# Patient Record
Sex: FEMALE | Race: WHITE | NOT HISPANIC OR LATINO | Employment: OTHER | URBAN - NONMETROPOLITAN AREA
[De-identification: names, ages, dates, MRNs, and addresses within clinical notes are randomized per-mention and may not be internally consistent; named-entity substitution may affect disease eponyms.]

---

## 2018-01-25 ENCOUNTER — HISTORICAL (OUTPATIENT)
Dept: ADMINISTRATIVE | Facility: HOSPITAL | Age: 83
End: 2018-01-25

## 2019-12-16 ENCOUNTER — HISTORICAL (OUTPATIENT)
Dept: ADMINISTRATIVE | Facility: HOSPITAL | Age: 84
End: 2019-12-16

## 2022-05-19 ENCOUNTER — HISTORICAL (OUTPATIENT)
Dept: ADMINISTRATIVE | Facility: HOSPITAL | Age: 87
End: 2022-05-19

## 2022-10-28 ENCOUNTER — HOSPITAL ENCOUNTER (EMERGENCY)
Facility: HOSPITAL | Age: 87
Discharge: HOME | End: 2022-10-28
Attending: EMERGENCY MEDICINE
Payer: MEDICARE

## 2022-10-28 VITALS
BODY MASS INDEX: 19.49 KG/M2 | WEIGHT: 110 LBS | RESPIRATION RATE: 18 BRPM | HEART RATE: 82 BPM | SYSTOLIC BLOOD PRESSURE: 155 MMHG | HEIGHT: 63 IN | DIASTOLIC BLOOD PRESSURE: 79 MMHG | TEMPERATURE: 98 F | OXYGEN SATURATION: 98 %

## 2022-10-28 DIAGNOSIS — M25.579 ANKLE PAIN, UNSPECIFIED CHRONICITY, UNSPECIFIED LATERALITY: Primary | ICD-10-CM

## 2022-10-28 DIAGNOSIS — R52 PAIN: ICD-10-CM

## 2022-10-28 DIAGNOSIS — M79.605 LEFT LEG PAIN: ICD-10-CM

## 2022-10-28 PROCEDURE — 99284 EMERGENCY DEPT VISIT MOD MDM: CPT | Mod: 25

## 2022-10-28 PROCEDURE — 96372 THER/PROPH/DIAG INJ SC/IM: CPT | Performed by: NURSE PRACTITIONER

## 2022-10-28 PROCEDURE — 63600175 PHARM REV CODE 636 W HCPCS: Performed by: NURSE PRACTITIONER

## 2022-10-28 RX ORDER — INDOMETHACIN 50 MG/1
50 CAPSULE ORAL
Qty: 15 CAPSULE | Refills: 0 | Status: SHIPPED | OUTPATIENT
Start: 2022-10-28 | End: 2023-03-10

## 2022-10-28 RX ORDER — KETOROLAC TROMETHAMINE 30 MG/ML
30 INJECTION, SOLUTION INTRAMUSCULAR; INTRAVENOUS
Status: COMPLETED | OUTPATIENT
Start: 2022-10-28 | End: 2022-10-28

## 2022-10-28 RX ADMIN — KETOROLAC TROMETHAMINE 30 MG: 60 INJECTION, SOLUTION INTRAMUSCULAR at 05:10

## 2022-10-28 NOTE — ED PROVIDER NOTES
Encounter Date: 10/28/2022       History     Chief Complaint   Patient presents with    Ankle Pain    Leg Pain    Knee Pain     C/o left ankle pain, lt foot pain, lt leg pain, and lt knee pain for few days     89-year-old  female presents emerged department complaints of left ankle, foot, leg pain that started a few days ago.  She states this started when she woke up 1 morning.  She denies any injury or trauma.  She does have history of DVT and stop taking her anticoagulants because she stated that it was killing her.  Patient states the pain is alleviated with rest and immobilization worse with any palpation or putting weight.  Patient denies any other complaints at this time.    Review of patient's allergies indicates:   Allergen Reactions    Acetaminophen     Sulfa (sulfonamide antibiotics)      No past medical history on file.  No past surgical history on file.  No family history on file.  Social History     Tobacco Use    Smoking status: Never    Smokeless tobacco: Never     Review of Systems   Constitutional:  Negative for activity change, appetite change, chills and fever.   HENT:  Negative for congestion, dental problem, sore throat and voice change.    Eyes:  Negative for discharge and itching.   Respiratory:  Negative for apnea, chest tightness and shortness of breath.    Cardiovascular:  Negative for chest pain.   Gastrointestinal:  Negative for abdominal distention, abdominal pain and nausea.   Endocrine: Negative for cold intolerance and heat intolerance.   Genitourinary:  Negative for dysuria, vaginal discharge and vaginal pain.   Musculoskeletal:  Positive for myalgias. Negative for back pain.        Left leg pain.   Skin:  Negative for rash and wound.   Allergic/Immunologic: Negative for environmental allergies and food allergies.   Neurological:  Negative for dizziness, facial asymmetry and weakness.   Hematological:  Does not bruise/bleed easily.   Psychiatric/Behavioral:  Negative for  agitation and behavioral problems.    All other systems reviewed and are negative.    Physical Exam     Initial Vitals [10/28/22 1516]   BP Pulse Resp Temp SpO2   (!) 174/90 70 18 97.4 °F (36.3 °C) 97 %      MAP       --         Physical Exam    Nursing note and vitals reviewed.  Constitutional: Vital signs are normal. She appears well-developed and well-nourished.  Non-toxic appearance. She does not have a sickly appearance.   HENT:   Head: Normocephalic and atraumatic.   Right Ear: External ear normal.   Left Ear: External ear normal.   Eyes: Conjunctivae, EOM and lids are normal. Pupils are equal, round, and reactive to light. Lids are everted and swept, no foreign bodies found.   Neck: Trachea normal and phonation normal. Neck supple. No thyroid mass and no thyromegaly present.   Normal range of motion.   Full passive range of motion without pain.     Cardiovascular:  Normal rate, regular rhythm, S1 normal, S2 normal, normal heart sounds, intact distal pulses and normal pulses.           Pulmonary/Chest: Breath sounds normal.   Abdominal: Abdomen is soft.   Musculoskeletal:         General: Tenderness present. No edema.      Cervical back: Full passive range of motion without pain, normal range of motion and neck supple.      Comments: Limited range of motion due to the pain.  Bilateral legs do appear pale but they are both warm to touch with comparison.  Palpable pulses.  Cap refills intact.     Lymphadenopathy:     She has no cervical adenopathy.   Neurological: She is alert and oriented to person, place, and time. She has normal strength and normal reflexes. GCS score is 15. GCS eye subscore is 4. GCS verbal subscore is 5. GCS motor subscore is 6.   Skin: Skin is warm, dry and intact. Capillary refill takes less than 2 seconds.   Psychiatric: She has a normal mood and affect. Her speech is normal and behavior is normal. Judgment normal. Cognition and memory are normal.       ED Course   Procedures  Labs  Reviewed - No data to display       Imaging Results              US Lower Extremity Veins Left (Final result)  Result time 10/28/22 16:16:41      Final result by Augusto Rosenthal MD (10/28/22 16:16:41)                   Impression:      1. NEGATIVE LEFT LOWER EXTREMITY VENOUS DOPPLER EXAM WITHOUT SONOGRAPHIC EVIDENCE OF A DEEP VENOUS THROMBOSIS.      Electronically signed by: Augusto Rosenthal  Date:    10/28/2022  Time:    16:16               Narrative:    EXAMINATION:  LEFT LOWER EXTREMITY VENOUS ULTRASOUND:    CLINICAL HISTORY:  Pain in left leg,    COMPARISON:  None available    FINDINGS:  There is normal compression and flow noted to the left common femoral, superficial femoral, popliteal, and  posterior tibial veins .  No evidence of deep venous thrombosis is identified                                       X-Ray Ankle Complete Left (Final result)  Result time 10/28/22 16:16:25      Final result by Augusto Rosenthal MD (10/28/22 16:16:25)                   Impression:      1. Acute osseous defect identified  2. Osteopenia  3. Enthesophyte inferior calcaneus 4.  MR examination would allow further evaluation if clinically imdicated      Electronically signed by: Augusto Rosenthal  Date:    10/28/2022  Time:    16:16               Narrative:    EXAMINATION:  XR ANKLE COMPLETE 3 VIEW LEFT    CLINICAL HISTORY:  Pain, unspecified; .    COMPARISON:  None available.    FINDINGS:  AP, lateral, and obliques views reveal no definite fracture or dislocation.  The ankle mortise is intact.  No aggressive osteolytic or osteoblastic lesion is seen.  Bony structures are osteopenic.  A small enthesophyte is noted to the inferior calcaneus.  Atherosclerosis is seen.                                       X-Ray Foot Complete Left (Final result)  Result time 10/28/22 16:15:50      Final result by Augusto Rosenthal MD (10/28/22 16:15:50)                   Impression:      1. No acute osseous defect identified  2. Mild osteoarthritis  3.  Osteopenia  4. Slight hallucis valgus  5. MR examination would allow further evaluation if clinically imdicated      Electronically signed by: Augusto Rosenthal  Date:    10/28/2022  Time:    16:15               Narrative:    EXAMINATION:  XR FOOT COMPLETE 3 VIEW LEFT    CLINICAL HISTORY:  Pain, unspecified; .    COMPARISON:  None available.    FINDINGS:  AP, lateral, and obliques views revealno definite fracture or dislocation.  Bony structures are osteopenic.  There is mild flattening of the plantar longitudinal arch.  A small enthesophyte is evident at the inferior calcaneus.  There is mild persistent flexion/extension of the toes.  There is slight lateral deviation of the 1st toe.                                       Medications - No data to display  Medical Decision Making:   Initial Assessment:   Bilateral pedal pulses Doppler at this time and are equal with left versus the right.  Will do ultrasound and x-rays rule out any bony abnormality or vascular issue.  Will defer the foot plan care once his results have been obtained.                        Clinical Impression:   Final diagnoses:  [R52] Pain  [M79.605] Left leg pain  [M25.579] Ankle pain, unspecified chronicity, unspecified laterality (Primary)        ED Disposition Condition    Discharge Stable          ED Prescriptions       Medication Sig Dispense Start Date End Date Auth. Provider    indomethacin (INDOCIN) 50 MG capsule Take 1 capsule (50 mg total) by mouth 3 (three) times daily with meals. 15 capsule 10/28/2022 -- MELO Mack          Follow-up Information       Follow up With Specialties Details Why Contact Info Additional Information    [facility] Call in 1 week As needed, For ER Follow Up. [address] [facility] [id number] [phone] [facility]             MELO Mack  10/28/22 3251

## 2022-10-28 NOTE — DISCHARGE INSTRUCTIONS
Take medicines as prescribed    See your family doctor in one to 2 days for further evaluation, workup, and treatment as necessary    Avoid driving or operating machinery while taking medicines as some medicines might cause drowsiness and may cause problems. Also pain medicines have potential of being addictive  so use Pain meds specially Narcotics Sparingly.    The exam and treatment you received in Emergency Room was for an urgent problem and NOT INTENDED AS COMPLETE CARE. It is important that you FOLLOW UP with a doctor for ongoing care. If your symptoms become WORSE or you DO NOT IMPROVE and you are unable to reach your health care provider, you should RETURN to the emergency department. The Emergency Room doctor has provided a PRELIMINARY INTERPRETATION of all your STUDIES. A final interpretation may be done after you are discharged. IF A CHANGE in your diagnosis or treatment is needed WE WILL CONTACT YOU. It is critical that we have a CURRENT PHONE NUMBER FOR YOU.    
No

## 2022-12-05 ENCOUNTER — LAB VISIT (OUTPATIENT)
Dept: LAB | Facility: HOSPITAL | Age: 87
End: 2022-12-05
Attending: FAMILY MEDICINE
Payer: MEDICARE

## 2022-12-05 DIAGNOSIS — I25.119 ATHEROSCLEROTIC HEART DISEASE OF NATIVE CORONARY ARTERY WITH ANGINA PECTORIS: ICD-10-CM

## 2022-12-05 DIAGNOSIS — E55.9 VITAMIN D DEFICIENCY DISEASE: ICD-10-CM

## 2022-12-05 DIAGNOSIS — I10 ESSENTIAL HYPERTENSION, MALIGNANT: Primary | ICD-10-CM

## 2022-12-05 LAB
ALBUMIN SERPL-MCNC: 3.7 GM/DL (ref 3.4–4.8)
ALBUMIN/GLOB SERPL: 1.5 RATIO (ref 1.1–2)
ALP SERPL-CCNC: 80 UNIT/L (ref 40–150)
ALT SERPL-CCNC: 14 UNIT/L (ref 0–55)
AST SERPL-CCNC: 23 UNIT/L (ref 5–34)
BASOPHILS # BLD AUTO: 0.01 X10(3)/MCL (ref 0–0.2)
BASOPHILS NFR BLD AUTO: 0.3 %
BILIRUBIN DIRECT+TOT PNL SERPL-MCNC: 0.4 MG/DL
BUN SERPL-MCNC: 15 MG/DL (ref 9.8–20.1)
CALCIUM SERPL-MCNC: 9 MG/DL (ref 8.4–10.2)
CHLORIDE SERPL-SCNC: 109 MMOL/L (ref 98–107)
CHOLEST SERPL-MCNC: 130 MG/DL
CHOLEST/HDLC SERPL: 4 {RATIO} (ref 0–5)
CO2 SERPL-SCNC: 24 MMOL/L (ref 23–31)
CREAT SERPL-MCNC: 1.19 MG/DL (ref 0.55–1.02)
DEPRECATED CALCIDIOL+CALCIFEROL SERPL-MC: 12.7 NG/ML (ref 30–80)
EOSINOPHIL # BLD AUTO: 0.05 X10(3)/MCL (ref 0–0.9)
EOSINOPHIL NFR BLD AUTO: 1.5 %
ERYTHROCYTE [DISTWIDTH] IN BLOOD BY AUTOMATED COUNT: 16.1 % (ref 11.5–17)
GFR SERPLBLD CREATININE-BSD FMLA CKD-EPI: 44 MLS/MIN/1.73/M2
GLOBULIN SER-MCNC: 2.5 GM/DL (ref 2.4–3.5)
GLUCOSE SERPL-MCNC: 118 MG/DL (ref 82–115)
HCT VFR BLD AUTO: 29.8 % (ref 37–47)
HDLC SERPL-MCNC: 36 MG/DL (ref 35–60)
HGB BLD-MCNC: 9.2 GM/DL (ref 12–16)
IMM GRANULOCYTES # BLD AUTO: 0.02 X10(3)/MCL (ref 0–0.04)
IMM GRANULOCYTES NFR BLD AUTO: 0.6 %
LDLC SERPL CALC-MCNC: 80 MG/DL (ref 50–140)
LYMPHOCYTES # BLD AUTO: 0.6 X10(3)/MCL (ref 0.6–4.6)
LYMPHOCYTES NFR BLD AUTO: 17.9 %
MCH RBC QN AUTO: 29.7 PG (ref 27–31)
MCHC RBC AUTO-ENTMCNC: 30.9 MG/DL (ref 33–36)
MCV RBC AUTO: 96.1 FL (ref 80–94)
MONOCYTES # BLD AUTO: 0.46 X10(3)/MCL (ref 0.1–1.3)
MONOCYTES NFR BLD AUTO: 13.7 %
NEUTROPHILS # BLD AUTO: 2.2 X10(3)/MCL (ref 2.1–9.2)
NEUTROPHILS NFR BLD AUTO: 66 %
PLATELET # BLD AUTO: 220 X10(3)/MCL (ref 130–400)
PMV BLD AUTO: 10.1 FL (ref 7.4–10.4)
POTASSIUM SERPL-SCNC: 4.4 MMOL/L (ref 3.5–5.1)
PROT SERPL-MCNC: 6.2 GM/DL (ref 5.8–7.6)
RBC # BLD AUTO: 3.1 X10(6)/MCL (ref 4.2–5.4)
SODIUM SERPL-SCNC: 141 MMOL/L (ref 136–145)
T4 FREE SERPL-MCNC: 0.59 NG/DL (ref 0.7–1.48)
TRIGL SERPL-MCNC: 71 MG/DL (ref 37–140)
TSH SERPL-ACNC: 51.11 UIU/ML (ref 0.35–4.94)
VLDLC SERPL CALC-MCNC: 14 MG/DL
WBC # SPEC AUTO: 3.4 X10(3)/MCL (ref 4.5–11.5)

## 2022-12-05 PROCEDURE — 84443 ASSAY THYROID STIM HORMONE: CPT

## 2022-12-05 PROCEDURE — 36415 COLL VENOUS BLD VENIPUNCTURE: CPT

## 2022-12-05 PROCEDURE — 85025 COMPLETE CBC W/AUTO DIFF WBC: CPT

## 2022-12-05 PROCEDURE — 84439 ASSAY OF FREE THYROXINE: CPT

## 2022-12-05 PROCEDURE — 80061 LIPID PANEL: CPT

## 2022-12-05 PROCEDURE — 80053 COMPREHEN METABOLIC PANEL: CPT

## 2022-12-05 PROCEDURE — 82306 VITAMIN D 25 HYDROXY: CPT

## 2022-12-28 ENCOUNTER — HISTORICAL (OUTPATIENT)
Dept: ADMINISTRATIVE | Facility: HOSPITAL | Age: 87
End: 2022-12-28
Payer: MEDICARE

## 2023-01-06 ENCOUNTER — HISTORICAL (OUTPATIENT)
Dept: ADMINISTRATIVE | Facility: HOSPITAL | Age: 88
End: 2023-01-06
Payer: MEDICARE

## 2023-01-16 ENCOUNTER — HISTORICAL (OUTPATIENT)
Dept: ADMINISTRATIVE | Facility: HOSPITAL | Age: 88
End: 2023-01-16
Payer: MEDICARE

## 2023-02-17 ENCOUNTER — LAB VISIT (OUTPATIENT)
Dept: LAB | Facility: HOSPITAL | Age: 88
End: 2023-02-17
Attending: SPECIALIST
Payer: MEDICARE

## 2023-02-17 DIAGNOSIS — M31.6 GIANT CELL ARTERITIS: Primary | ICD-10-CM

## 2023-02-17 LAB
ABS NEUT CALC (OHS): 2.38 X10(3)/MCL (ref 2.1–9.2)
C-REACTIVE PROTEIN(NORTH LA, ST. MARY, RP & JENNINGS): <0.5 MG/DL (ref 0–0.9)
EOSINOPHIL NFR BLD MANUAL: 0.04 X10(3)/MCL (ref 0–0.9)
EOSINOPHIL NFR BLD MANUAL: 1 % (ref 0–8)
ERYTHROCYTE [DISTWIDTH] IN BLOOD BY AUTOMATED COUNT: 14.2 % (ref 11–14.5)
ERYTHROCYTE [SEDIMENTATION RATE] IN BLOOD: 4 MM/HR (ref 0–20)
HCT VFR BLD AUTO: 28.9 % (ref 36–48)
HGB BLD-MCNC: 9.5 GM/DL (ref 11.8–16)
IMM GRANULOCYTES # BLD AUTO: 0.01 X10(3)/MCL (ref 0–0.03)
IMM GRANULOCYTES NFR BLD AUTO: 0.3 % (ref 0–0.5)
LYMPHOCYTES NFR BLD MANUAL: 0.76 X10(3)/MCL
LYMPHOCYTES NFR BLD MANUAL: 21 % (ref 13–40)
MCH RBC QN AUTO: 31.1 PG (ref 27–34)
MCV RBC AUTO: 94.8 FL (ref 79–99)
MEAN CELL HEMOGLOBIN CONCENTRATION (OHS) G/DL: 32.9 G/DL (ref 31–37)
MONOCYTES NFR BLD MANUAL: 0.43 X10(3)/MCL (ref 0.1–1.3)
MONOCYTES NFR BLD MANUAL: 12 % (ref 2–11)
NEUTROPHILS NFR BLD MANUAL: 66 % (ref 47–80)
NRBC BLD AUTO-RTO: 0 % (ref 0–1)
PLATELET # BLD AUTO: 181 X10(3)/MCL (ref 140–371)
PMV BLD AUTO: 9.9 FL (ref 9.4–12.4)
RBC # BLD AUTO: 3.05 X10(6)/MCL (ref 4–5.1)
WBC # SPEC AUTO: 3.6 X10(3)/MCL (ref 4–11.5)

## 2023-02-17 PROCEDURE — 85651 RBC SED RATE NONAUTOMATED: CPT

## 2023-02-17 PROCEDURE — 85025 COMPLETE CBC W/AUTO DIFF WBC: CPT

## 2023-02-17 PROCEDURE — 85027 COMPLETE CBC AUTOMATED: CPT

## 2023-02-17 PROCEDURE — 86140 C-REACTIVE PROTEIN: CPT

## 2023-02-17 PROCEDURE — 36415 COLL VENOUS BLD VENIPUNCTURE: CPT

## 2023-02-28 ENCOUNTER — LAB VISIT (OUTPATIENT)
Dept: LAB | Facility: HOSPITAL | Age: 88
End: 2023-02-28
Attending: INTERNAL MEDICINE
Payer: MEDICARE

## 2023-02-28 DIAGNOSIS — D68.9 CLOTTING DISORDER: Primary | ICD-10-CM

## 2023-02-28 DIAGNOSIS — I48.0 PAF (PAROXYSMAL ATRIAL FIBRILLATION): ICD-10-CM

## 2023-02-28 DIAGNOSIS — Z82.49 FAMILY HISTORY OF DVT: ICD-10-CM

## 2023-02-28 DIAGNOSIS — I10 HYPERTENSION, UNSPECIFIED TYPE: ICD-10-CM

## 2023-02-28 LAB
ANION GAP SERPL CALC-SCNC: 5 MEQ/L (ref 2–13)
BUN SERPL-MCNC: 26 MG/DL (ref 7–20)
CALCIUM SERPL-MCNC: 8.7 MG/DL (ref 8.4–10.2)
CHLORIDE SERPL-SCNC: 109 MMOL/L (ref 98–110)
CO2 SERPL-SCNC: 25 MMOL/L (ref 21–32)
CREAT SERPL-MCNC: 0.98 MG/DL (ref 0.66–1.25)
CREAT/UREA NIT SERPL: 27 (ref 12–20)
GFR SERPLBLD CREATININE-BSD FMLA CKD-EPI: 55 MLS/MIN/1.73/M2
GLUCOSE SERPL-MCNC: 112 MG/DL (ref 70–115)
HCT VFR BLD AUTO: 29.7 % (ref 36–48)
HGB BLD-MCNC: 9.5 G/DL (ref 11.8–16)
POTASSIUM SERPL-SCNC: 4.2 MMOL/L (ref 3.5–5.1)
SODIUM SERPL-SCNC: 139 MMOL/L (ref 135–145)

## 2023-02-28 PROCEDURE — 36415 COLL VENOUS BLD VENIPUNCTURE: CPT

## 2023-02-28 PROCEDURE — 80048 BASIC METABOLIC PNL TOTAL CA: CPT

## 2023-02-28 PROCEDURE — 85014 HEMATOCRIT: CPT

## 2023-03-03 ENCOUNTER — LAB VISIT (OUTPATIENT)
Dept: LAB | Facility: HOSPITAL | Age: 88
End: 2023-03-03
Attending: INTERNAL MEDICINE
Payer: MEDICARE

## 2023-03-03 DIAGNOSIS — Z86.69 HISTORY OF MYASTHENIA GRAVIS: ICD-10-CM

## 2023-03-03 DIAGNOSIS — E78.2 MIXED HYPERLIPIDEMIA: ICD-10-CM

## 2023-03-03 DIAGNOSIS — R06.09 DYSPNEA ON EXERTION: ICD-10-CM

## 2023-03-03 DIAGNOSIS — I82.409 DEEP VEIN THROMBOSIS: ICD-10-CM

## 2023-03-03 DIAGNOSIS — D68.9 CLOTTING DISORDER: Primary | ICD-10-CM

## 2023-03-03 DIAGNOSIS — E03.9 HYPOTHYROIDISM, UNSPECIFIED TYPE: ICD-10-CM

## 2023-03-03 DIAGNOSIS — K21.9 GASTROESOPHAGEAL REFLUX DISEASE, UNSPECIFIED WHETHER ESOPHAGITIS PRESENT: ICD-10-CM

## 2023-03-03 LAB
ABS NEUT CALC (OHS): 2.96 X10(3)/MCL (ref 2.1–9.2)
ANION GAP SERPL CALC-SCNC: 3 MEQ/L (ref 2–13)
BUN SERPL-MCNC: 25 MG/DL (ref 7–20)
CALCIUM SERPL-MCNC: 8.5 MG/DL (ref 8.4–10.2)
CHLORIDE SERPL-SCNC: 114 MMOL/L (ref 98–110)
CO2 SERPL-SCNC: 23 MMOL/L (ref 21–32)
CREAT SERPL-MCNC: 0.91 MG/DL (ref 0.66–1.25)
CREAT/UREA NIT SERPL: 27 (ref 12–20)
ERYTHROCYTE [DISTWIDTH] IN BLOOD BY AUTOMATED COUNT: 14.5 % (ref 11–14.5)
GFR SERPLBLD CREATININE-BSD FMLA CKD-EPI: 60 MLS/MIN/1.73/M2
GLUCOSE SERPL-MCNC: 96 MG/DL (ref 70–115)
HCT VFR BLD AUTO: 27.2 % (ref 36–48)
HGB BLD-MCNC: 9 G/DL (ref 11.8–16)
IMM GRANULOCYTES # BLD AUTO: 0.02 X10(3)/MCL (ref 0–0.03)
IMM GRANULOCYTES NFR BLD AUTO: 0.5 % (ref 0–0.5)
INR BLD: 1.14
LYMPHOCYTES NFR BLD MANUAL: 0.59 X10(3)/MCL
LYMPHOCYTES NFR BLD MANUAL: 16 % (ref 13–40)
MAGNESIUM SERPL-MCNC: 2.1 MG/DL (ref 1.8–2.4)
MCH RBC QN AUTO: 31.3 PG (ref 27–34)
MCV RBC AUTO: 94.4 FL (ref 79–99)
MEAN CELL HEMOGLOBIN CONCENTRATION (OHS) G/DL: 33.1 G/DL (ref 31–37)
MONOCYTES NFR BLD MANUAL: 0.15 X10(3)/MCL (ref 0.1–1.3)
MONOCYTES NFR BLD MANUAL: 4 % (ref 2–11)
NEUTROPHILS NFR BLD MANUAL: 80 % (ref 47–80)
NRBC BLD AUTO-RTO: 0 % (ref 0–1)
PLATELET # BLD AUTO: 191 X10(3)/MCL (ref 140–371)
PMV BLD AUTO: 11.3 FL (ref 9.4–12.4)
POTASSIUM SERPL-SCNC: 5.1 MMOL/L (ref 3.5–5.1)
PROTHROMBIN TIME: 11.5 SECONDS (ref 9.3–11.9)
RBC # BLD AUTO: 2.88 X10(6)/MCL (ref 4–5.1)
RBC MORPH BLD: NORMAL
SODIUM SERPL-SCNC: 140 MMOL/L (ref 135–145)
WBC # SPEC AUTO: 3.7 X10(3)/MCL (ref 4–11.5)

## 2023-03-03 PROCEDURE — 80048 BASIC METABOLIC PNL TOTAL CA: CPT

## 2023-03-03 PROCEDURE — 36415 COLL VENOUS BLD VENIPUNCTURE: CPT

## 2023-03-03 PROCEDURE — 83735 ASSAY OF MAGNESIUM: CPT

## 2023-03-03 PROCEDURE — 85610 PROTHROMBIN TIME: CPT

## 2023-03-03 PROCEDURE — 85027 COMPLETE CBC AUTOMATED: CPT

## 2023-03-10 ENCOUNTER — ANESTHESIA (OUTPATIENT)
Dept: CARDIOLOGY | Facility: HOSPITAL | Age: 88
End: 2023-03-10
Payer: MEDICARE

## 2023-03-10 ENCOUNTER — HOSPITAL ENCOUNTER (OUTPATIENT)
Dept: CARDIOLOGY | Facility: HOSPITAL | Age: 88
Discharge: HOME | End: 2023-03-10
Attending: INTERNAL MEDICINE
Payer: MEDICARE

## 2023-03-10 ENCOUNTER — ANESTHESIA EVENT (OUTPATIENT)
Dept: CARDIOLOGY | Facility: HOSPITAL | Age: 88
End: 2023-03-10
Payer: MEDICARE

## 2023-03-10 VITALS
OXYGEN SATURATION: 95 % | SYSTOLIC BLOOD PRESSURE: 114 MMHG | DIASTOLIC BLOOD PRESSURE: 67 MMHG | RESPIRATION RATE: 15 BRPM | HEIGHT: 63 IN | HEART RATE: 70 BPM | TEMPERATURE: 98 F | BODY MASS INDEX: 19.64 KG/M2 | WEIGHT: 110.88 LBS

## 2023-03-10 DIAGNOSIS — I48.0 PAROXYSMAL ATRIAL FIBRILLATION: ICD-10-CM

## 2023-03-10 DIAGNOSIS — I48.91 A-FIB: ICD-10-CM

## 2023-03-10 DIAGNOSIS — I48.91 ATRIAL FIBRILLATION: ICD-10-CM

## 2023-03-10 DIAGNOSIS — I48.0 PAROXYSMAL ATRIAL FIBRILLATION: Primary | ICD-10-CM

## 2023-03-10 PROCEDURE — 25000003 PHARM REV CODE 250: Performed by: NURSE ANESTHETIST, CERTIFIED REGISTERED

## 2023-03-10 PROCEDURE — 37000009 HC ANESTHESIA EA ADD 15 MINS

## 2023-03-10 PROCEDURE — 63600175 PHARM REV CODE 636 W HCPCS: Performed by: NURSE ANESTHETIST, CERTIFIED REGISTERED

## 2023-03-10 PROCEDURE — 93325 DOPPLER ECHO COLOR FLOW MAPG: CPT

## 2023-03-10 PROCEDURE — 37000008 HC ANESTHESIA 1ST 15 MINUTES

## 2023-03-10 PROCEDURE — 93005 ELECTROCARDIOGRAM TRACING: CPT | Mod: 59

## 2023-03-10 RX ORDER — PROPOFOL 10 MG/ML
VIAL (ML) INTRAVENOUS
Status: DISCONTINUED | OUTPATIENT
Start: 2023-03-10 | End: 2023-03-10

## 2023-03-10 RX ORDER — LIDOCAINE HYDROCHLORIDE 20 MG/ML
INJECTION INTRAVENOUS
Status: DISCONTINUED | OUTPATIENT
Start: 2023-03-10 | End: 2023-03-10

## 2023-03-10 RX ORDER — FAMOTIDINE 40 MG/1
40 TABLET, FILM COATED ORAL DAILY
COMMUNITY

## 2023-03-10 RX ORDER — TRAZODONE HYDROCHLORIDE 50 MG/1
50 TABLET ORAL NIGHTLY
COMMUNITY
Start: 2022-10-24

## 2023-03-10 RX ORDER — PRAVASTATIN SODIUM 20 MG/1
20 TABLET ORAL NIGHTLY
COMMUNITY
Start: 2022-10-31

## 2023-03-10 RX ORDER — LEVOTHYROXINE SODIUM 112 UG/1
112 TABLET ORAL EVERY MORNING
COMMUNITY
Start: 2022-10-24

## 2023-03-10 RX ORDER — GABAPENTIN 300 MG/1
300 CAPSULE ORAL 3 TIMES DAILY
COMMUNITY
Start: 2022-10-31

## 2023-03-10 RX ORDER — OLMESARTAN MEDOXOMIL 20 MG/1
20 TABLET ORAL DAILY
COMMUNITY
Start: 2022-10-24

## 2023-03-10 RX ORDER — ISOSORBIDE MONONITRATE 10 MG/1
10 TABLET ORAL 2 TIMES DAILY
COMMUNITY
Start: 2022-10-24

## 2023-03-10 RX ORDER — NITROGLYCERIN 0.4 MG/1
1 TABLET SUBLINGUAL DAILY PRN
COMMUNITY

## 2023-03-10 RX ADMIN — PROPOFOL 40 MG: 10 INJECTION, EMULSION INTRAVENOUS at 11:03

## 2023-03-10 RX ADMIN — SODIUM CHLORIDE, SODIUM GLUCONATE, SODIUM ACETATE, POTASSIUM CHLORIDE AND MAGNESIUM CHLORIDE: 526; 502; 368; 37; 30 INJECTION, SOLUTION INTRAVENOUS at 11:03

## 2023-03-10 RX ADMIN — LIDOCAINE HYDROCHLORIDE 80 MG: 20 INJECTION, SOLUTION INTRAVENOUS at 11:03

## 2023-03-10 RX ADMIN — PROPOFOL 20 MG: 10 INJECTION, EMULSION INTRAVENOUS at 11:03

## 2023-03-10 NOTE — INTERVAL H&P NOTE
The patient has been examined and the H&P has been reviewed:    I concur with the findings and no changes have occurred since H&P was written.  We  will proceed with a MALU to assess LA Appendage anatomy.         Active Hospital Problems    Diagnosis  POA    *Paroxysmal atrial fibrillation [I48.0]  Yes      Resolved Hospital Problems   No resolved problems to display.

## 2023-03-10 NOTE — ANESTHESIA PREPROCEDURE EVALUATION
03/10/2023  Katie Murphy is a 89 y.o., female.    Other Medical History   Ascending aorta dilatation TIA (transient ischemic attack)   PAF (paroxysmal atrial fibrillation) JONES (dyspnea on exertion)   Pacemaker DVT (deep venous thrombosis)   CAD (coronary artery disease) Myasthenia gravis without (acute) exacerbation   Hypothyroidism, unspecified Mixed hyperlipidemia   Breast cancer GERD (gastroesophageal reflux disease)   Clotting disorder Essential (primary) hypertension     Surgical History  BREAST LUMPECTOMY CHOLECYSTECTOMY   APPENDECTOMY      Watchman eval    Pre-op Assessment    I have reviewed the Patient Summary Reports.     I have reviewed the Nursing Notes. I have reviewed the NPO Status.   I have reviewed the Medications.     Review of Systems  Anesthesia Hx:   Denies Personal Hx of Anesthesia complications.   Cardiovascular:   Hypertension CAD   JONES    Pulmonary:   Shortness of breath    Hepatic/GI:   GERD    Neurological:   TIA, Neuromuscular Disease, (myasthenia gravis)    Endocrine:   Hypothyroidism        Physical Exam  General: Well nourished, Cooperative and Alert    Airway:  Mallampati: II   Mouth Opening: Normal  TM Distance: Normal      Dental:  Edentulous    Chest/Lungs:  Normal Respiratory Rate    Heart:  Rate: Normal        Anesthesia Plan  Type of Anesthesia, risks & benefits discussed:    Anesthesia Type: Gen Natural Airway  Intra-op Monitoring Plan: Standard ASA Monitors  Post Op Pain Control Plan: IV/PO Opioids PRN  (medical reason for not using multimodal pain management)  Induction:  IV  Informed Consent: Informed consent signed with the Patient and all parties understand the risks and agree with anesthesia plan.  All questions answered.   ASA Score: 3  Day of Surgery Review of History & Physical: H&P Update referred to the surgeon/provider.    Ready For Surgery From Anesthesia  Perspective.     .

## 2023-03-10 NOTE — TRANSFER OF CARE
"Anesthesia Transfer of Care Note    Patient: Katie Murphy    Procedure(s) Performed: * No procedures listed *    Patient location: [facility]    Anesthesia Type: general    Transport from OR: Transported from OR on room air with adequate spontaneous ventilation    Post pain: adequate analgesia    Post assessment: no apparent anesthetic complications and tolerated procedure well    Post vital signs: stable    Level of consciousness: awake and alert    Nausea/Vomiting: no nausea/vomiting    Complications: none    Transfer of care protocol was followed      Last vitals:   Visit Vitals  /71 (BP Location: Right arm)   Pulse 75   Temp 36.6 °C (97.9 °F) (Oral)   Resp 16   Ht 5' 3" (1.6 m)   Wt 50.3 kg (110 lb 14.3 oz)   SpO2 100%   Breastfeeding No   BMI 19.64 kg/m²     "

## 2023-03-14 NOTE — ANESTHESIA POSTPROCEDURE EVALUATION
Anesthesia Post Evaluation    Patient: Katie Murphy    Procedure(s) Performed: * No procedures listed *    Final Anesthesia Type: general (natural airway)      Patient location during evaluation: PACU  Patient participation: Yes- Able to Participate  Level of consciousness: awake and alert  Post-procedure vital signs: reviewed and stable  Pain management: adequate  Airway patency: patent    PONV status at discharge: No PONV  Anesthetic complications: no      Respiratory status: unassisted  Hydration status: euvolemic  Follow-up not needed.          Vitals Value Taken Time   /67 03/10/23 1210   Temp nl 03/14/23 0651   Pulse 72 03/10/23 1252   Resp 24 03/10/23 1250   SpO2 80 % 03/10/23 1252   Vitals shown include unvalidated device data.      No case tracking events are documented in the log.      Pain/Faith Score: No data recorded

## 2023-03-20 NOTE — DISCHARGE SUMMARY
[facility] - Cath Lab Services  Discharge Note  Short Stay    Transesophageal echo (MALU)      OUTCOME: Patient tolerated treatment/procedure well without complication and is now ready for discharge.  Patient had a MALU to assess the anatomy of IMANI.     DISPOSITION: Home or Self Care    FINAL DIAGNOSIS:  Paroxysmal atrial fibrillation    FOLLOWUP: In clinic    DISCHARGE INSTRUCTIONS:  No discharge procedures on file.      Clinical Reference Documents Added to Patient Instructions         Document    GENERAL ANESTHESIA DISCHARGE INSTRUCTIONS (ENGLISH)    TRANSESOPHAGEAL ECHOCARDIOGRAM (ENGLISH)            TIME SPENT ON DISCHARGE: 30 minutes

## 2023-03-22 LAB
BSA FOR ECHO PROCEDURE: 1.5 M2
EJECTION FRACTION: 40 %

## 2023-04-03 ENCOUNTER — ANESTHESIA EVENT (OUTPATIENT)
Dept: CARDIOLOGY | Facility: HOSPITAL | Age: 88
DRG: 274 | End: 2023-04-03
Payer: MEDICARE

## 2023-04-04 RX ORDER — RANOLAZINE 500 MG/1
500 TABLET, EXTENDED RELEASE ORAL 2 TIMES DAILY
COMMUNITY

## 2023-04-04 RX ORDER — METOPROLOL SUCCINATE 50 MG/1
50 TABLET, EXTENDED RELEASE ORAL DAILY
COMMUNITY

## 2023-04-05 ENCOUNTER — HOSPITAL ENCOUNTER (INPATIENT)
Facility: HOSPITAL | Age: 88
LOS: 2 days | Discharge: HOME | DRG: 274 | End: 2023-04-07
Attending: INTERNAL MEDICINE | Admitting: INTERNAL MEDICINE
Payer: MEDICARE

## 2023-04-05 ENCOUNTER — ANESTHESIA (OUTPATIENT)
Dept: CARDIOLOGY | Facility: HOSPITAL | Age: 88
DRG: 274 | End: 2023-04-05
Payer: MEDICARE

## 2023-04-05 DIAGNOSIS — I48.91 A-FIB: ICD-10-CM

## 2023-04-05 DIAGNOSIS — I48.0 PAROXYSMAL ATRIAL FIBRILLATION: ICD-10-CM

## 2023-04-05 DIAGNOSIS — I48.91 ATRIAL FIBRILLATION: ICD-10-CM

## 2023-04-05 DIAGNOSIS — I48.20 ATRIAL FIBRILLATION, CHRONIC: ICD-10-CM

## 2023-04-05 LAB
BSA FOR ECHO PROCEDURE: 1.47 M2
GROUP & RH: ABNORMAL
INDIRECT COOMBS GEL: ABNORMAL
SPECIMEN OUTDATE: ABNORMAL

## 2023-04-05 PROCEDURE — D9220A PRA ANESTHESIA: ICD-10-PCS | Mod: Q0,CRNA,, | Performed by: NURSE ANESTHETIST, CERTIFIED REGISTERED

## 2023-04-05 PROCEDURE — 27201423 OPTIME MED/SURG SUP & DEVICES STERILE SUPPLY: Performed by: INTERNAL MEDICINE

## 2023-04-05 PROCEDURE — 33340 PERQ CLSR TCAT L ATR APNDGE: CPT | Performed by: INTERNAL MEDICINE

## 2023-04-05 PROCEDURE — 25000003 PHARM REV CODE 250: Performed by: NURSE ANESTHETIST, CERTIFIED REGISTERED

## 2023-04-05 PROCEDURE — D9220A PRA ANESTHESIA: Mod: Q0,CRNA,, | Performed by: NURSE ANESTHETIST, CERTIFIED REGISTERED

## 2023-04-05 PROCEDURE — 37000009 HC ANESTHESIA EA ADD 15 MINS: Performed by: INTERNAL MEDICINE

## 2023-04-05 PROCEDURE — 93010 EKG 12-LEAD: ICD-10-PCS | Mod: ,,, | Performed by: INTERNAL MEDICINE

## 2023-04-05 PROCEDURE — 63600175 PHARM REV CODE 636 W HCPCS: Performed by: INTERNAL MEDICINE

## 2023-04-05 PROCEDURE — C1894 INTRO/SHEATH, NON-LASER: HCPCS | Performed by: INTERNAL MEDICINE

## 2023-04-05 PROCEDURE — 11000001 HC ACUTE MED/SURG PRIVATE ROOM

## 2023-04-05 PROCEDURE — 36620 INSERTION CATHETER ARTERY: CPT | Mod: 59,Q0,, | Performed by: ANESTHESIOLOGY

## 2023-04-05 PROCEDURE — 21400001 HC TELEMETRY ROOM

## 2023-04-05 PROCEDURE — 25000003 PHARM REV CODE 250: Performed by: INTERNAL MEDICINE

## 2023-04-05 PROCEDURE — 86870 RBC ANTIBODY IDENTIFICATION: CPT | Performed by: INTERNAL MEDICINE

## 2023-04-05 PROCEDURE — 86922 COMPATIBILITY TEST ANTIGLOB: CPT

## 2023-04-05 PROCEDURE — D9220A PRA ANESTHESIA: Mod: Q0,ANES,, | Performed by: ANESTHESIOLOGY

## 2023-04-05 PROCEDURE — 93005 ELECTROCARDIOGRAM TRACING: CPT

## 2023-04-05 PROCEDURE — 25500020 PHARM REV CODE 255: Performed by: INTERNAL MEDICINE

## 2023-04-05 PROCEDURE — 63600175 PHARM REV CODE 636 W HCPCS: Performed by: NURSE ANESTHETIST, CERTIFIED REGISTERED

## 2023-04-05 PROCEDURE — 37000008 HC ANESTHESIA 1ST 15 MINUTES: Performed by: INTERNAL MEDICINE

## 2023-04-05 PROCEDURE — 93010 ELECTROCARDIOGRAM REPORT: CPT | Mod: ,,, | Performed by: INTERNAL MEDICINE

## 2023-04-05 PROCEDURE — 63600175 PHARM REV CODE 636 W HCPCS: Performed by: ANESTHESIOLOGY

## 2023-04-05 PROCEDURE — 63600175 PHARM REV CODE 636 W HCPCS

## 2023-04-05 PROCEDURE — 36620 ARTERIAL: ICD-10-PCS | Mod: 59,Q0,, | Performed by: ANESTHESIOLOGY

## 2023-04-05 PROCEDURE — D9220A PRA ANESTHESIA: ICD-10-PCS | Mod: Q0,ANES,, | Performed by: ANESTHESIOLOGY

## 2023-04-05 PROCEDURE — 86900 BLOOD TYPING SEROLOGIC ABO: CPT | Performed by: INTERNAL MEDICINE

## 2023-04-05 DEVICE — LEFT ATRIAL APPENDAGE OCCLUDER
Type: IMPLANTABLE DEVICE | Site: HEART | Status: FUNCTIONAL
Brand: AMPLATZER™ AMULET™

## 2023-04-05 RX ORDER — PHENYLEPHRINE HYDROCHLORIDE 10 MG/ML
INJECTION INTRAVENOUS
Status: DISCONTINUED | OUTPATIENT
Start: 2023-04-05 | End: 2023-04-05

## 2023-04-05 RX ORDER — LIDOCAINE HYDROCHLORIDE 20 MG/ML
INJECTION INTRAVENOUS
Status: DISCONTINUED | OUTPATIENT
Start: 2023-04-05 | End: 2023-04-05

## 2023-04-05 RX ORDER — ONDANSETRON 2 MG/ML
INJECTION INTRAMUSCULAR; INTRAVENOUS
Status: DISCONTINUED | OUTPATIENT
Start: 2023-04-05 | End: 2023-04-05

## 2023-04-05 RX ORDER — KETOROLAC TROMETHAMINE 30 MG/ML
15 INJECTION, SOLUTION INTRAMUSCULAR; INTRAVENOUS ONCE
Status: COMPLETED | OUTPATIENT
Start: 2023-04-05 | End: 2023-04-05

## 2023-04-05 RX ORDER — METHOCARBAMOL 100 MG/ML
500 INJECTION, SOLUTION INTRAMUSCULAR; INTRAVENOUS ONCE
Status: COMPLETED | OUTPATIENT
Start: 2023-04-05 | End: 2023-04-05

## 2023-04-05 RX ORDER — MORPHINE SULFATE 4 MG/ML
2 INJECTION, SOLUTION INTRAMUSCULAR; INTRAVENOUS EVERY 4 HOURS PRN
Status: DISCONTINUED | OUTPATIENT
Start: 2023-04-05 | End: 2023-04-07 | Stop reason: HOSPADM

## 2023-04-05 RX ORDER — FENTANYL CITRATE 50 UG/ML
INJECTION, SOLUTION INTRAMUSCULAR; INTRAVENOUS
Status: DISCONTINUED | OUTPATIENT
Start: 2023-04-05 | End: 2023-04-05

## 2023-04-05 RX ORDER — MEPERIDINE HYDROCHLORIDE 25 MG/ML
12.5 INJECTION INTRAMUSCULAR; INTRAVENOUS; SUBCUTANEOUS ONCE
Status: DISCONTINUED | OUTPATIENT
Start: 2023-04-05 | End: 2023-04-05 | Stop reason: HOSPADM

## 2023-04-05 RX ORDER — ROCURONIUM BROMIDE 10 MG/ML
INJECTION, SOLUTION INTRAVENOUS
Status: DISCONTINUED | OUTPATIENT
Start: 2023-04-05 | End: 2023-04-05

## 2023-04-05 RX ORDER — HYDROMORPHONE HYDROCHLORIDE 2 MG/ML
0.5 INJECTION, SOLUTION INTRAMUSCULAR; INTRAVENOUS; SUBCUTANEOUS EVERY 5 MIN PRN
Status: DISCONTINUED | OUTPATIENT
Start: 2023-04-05 | End: 2023-04-05 | Stop reason: HOSPADM

## 2023-04-05 RX ORDER — LABETALOL HYDROCHLORIDE 5 MG/ML
20 INJECTION, SOLUTION INTRAVENOUS ONCE
Status: COMPLETED | OUTPATIENT
Start: 2023-04-05 | End: 2023-04-05

## 2023-04-05 RX ORDER — AMOXICILLIN 875 MG/1
875 TABLET, FILM COATED ORAL 2 TIMES DAILY
Status: ON HOLD | COMMUNITY
End: 2023-04-07 | Stop reason: HOSPADM

## 2023-04-05 RX ORDER — ASPIRIN 81 MG/1
81 TABLET ORAL DAILY
Status: DISCONTINUED | OUTPATIENT
Start: 2023-04-06 | End: 2023-04-07 | Stop reason: HOSPADM

## 2023-04-05 RX ORDER — PROTAMINE SULFATE 10 MG/ML
INJECTION, SOLUTION INTRAVENOUS
Status: DISCONTINUED | OUTPATIENT
Start: 2023-04-05 | End: 2023-04-05

## 2023-04-05 RX ORDER — METHOCARBAMOL 100 MG/ML
INJECTION, SOLUTION INTRAMUSCULAR; INTRAVENOUS
Status: COMPLETED
Start: 2023-04-05 | End: 2023-04-05

## 2023-04-05 RX ORDER — ACETAMINOPHEN 10 MG/ML
INJECTION, SOLUTION INTRAVENOUS
Status: DISCONTINUED
Start: 2023-04-05 | End: 2023-04-05 | Stop reason: WASHOUT

## 2023-04-05 RX ORDER — HEPARIN SODIUM 1000 [USP'U]/ML
INJECTION, SOLUTION INTRAVENOUS; SUBCUTANEOUS
Status: DISCONTINUED | OUTPATIENT
Start: 2023-04-05 | End: 2023-04-05

## 2023-04-05 RX ORDER — HYDROMORPHONE HYDROCHLORIDE 2 MG/ML
0.2 INJECTION, SOLUTION INTRAMUSCULAR; INTRAVENOUS; SUBCUTANEOUS EVERY 5 MIN PRN
Status: CANCELLED | OUTPATIENT
Start: 2023-04-05

## 2023-04-05 RX ORDER — MUPIROCIN 20 MG/G
OINTMENT TOPICAL
Status: DISPENSED | OUTPATIENT
Start: 2023-04-05

## 2023-04-05 RX ORDER — PROPOFOL 10 MG/ML
VIAL (ML) INTRAVENOUS
Status: DISCONTINUED | OUTPATIENT
Start: 2023-04-05 | End: 2023-04-05

## 2023-04-05 RX ORDER — HYDRALAZINE HYDROCHLORIDE 20 MG/ML
10 INJECTION INTRAMUSCULAR; INTRAVENOUS
Status: COMPLETED | OUTPATIENT
Start: 2023-04-05 | End: 2023-04-06

## 2023-04-05 RX ORDER — HYDRALAZINE HYDROCHLORIDE 20 MG/ML
INJECTION INTRAMUSCULAR; INTRAVENOUS
Status: COMPLETED
Start: 2023-04-05 | End: 2023-04-05

## 2023-04-05 RX ORDER — CLOPIDOGREL BISULFATE 75 MG/1
75 TABLET ORAL DAILY
Status: DISCONTINUED | OUTPATIENT
Start: 2023-04-06 | End: 2023-04-07 | Stop reason: HOSPADM

## 2023-04-05 RX ORDER — DIMENHYDRINATE 50 MG
1 TABLET ORAL DAILY PRN
COMMUNITY

## 2023-04-05 RX ORDER — ONDANSETRON 2 MG/ML
4 INJECTION INTRAMUSCULAR; INTRAVENOUS ONCE
Status: DISCONTINUED | OUTPATIENT
Start: 2023-04-05 | End: 2023-04-05 | Stop reason: HOSPADM

## 2023-04-05 RX ORDER — SODIUM CHLORIDE 0.9 % (FLUSH) 0.9 %
10 SYRINGE (ML) INJECTION
Status: ACTIVE | OUTPATIENT
Start: 2023-04-05

## 2023-04-05 RX ORDER — CEFAZOLIN SODIUM 1 G/3ML
2 INJECTION, POWDER, FOR SOLUTION INTRAMUSCULAR; INTRAVENOUS
Status: DISPENSED | OUTPATIENT
Start: 2023-04-05

## 2023-04-05 RX ORDER — KETOROLAC TROMETHAMINE 30 MG/ML
INJECTION, SOLUTION INTRAMUSCULAR; INTRAVENOUS
Status: COMPLETED
Start: 2023-04-05 | End: 2023-04-05

## 2023-04-05 RX ORDER — DIPHENHYDRAMINE HYDROCHLORIDE 50 MG/ML
25 INJECTION INTRAMUSCULAR; INTRAVENOUS EVERY 6 HOURS PRN
Status: DISCONTINUED | OUTPATIENT
Start: 2023-04-05 | End: 2023-04-05 | Stop reason: HOSPADM

## 2023-04-05 RX ADMIN — HEPARIN SODIUM 5000 UNITS: 1000 INJECTION, SOLUTION INTRAVENOUS; SUBCUTANEOUS at 08:04

## 2023-04-05 RX ADMIN — PROPOFOL 70 MG: 10 INJECTION, EMULSION INTRAVENOUS at 08:04

## 2023-04-05 RX ADMIN — SODIUM CHLORIDE, SODIUM GLUCONATE, SODIUM ACETATE, POTASSIUM CHLORIDE AND MAGNESIUM CHLORIDE: 526; 502; 368; 37; 30 INJECTION, SOLUTION INTRAVENOUS at 08:04

## 2023-04-05 RX ADMIN — LABETALOL HYDROCHLORIDE 20 MG: 5 INJECTION, SOLUTION INTRAVENOUS at 10:04

## 2023-04-05 RX ADMIN — FENTANYL CITRATE 50 MCG: 50 INJECTION, SOLUTION INTRAMUSCULAR; INTRAVENOUS at 08:04

## 2023-04-05 RX ADMIN — MUPIROCIN: 20 OINTMENT TOPICAL at 06:04

## 2023-04-05 RX ADMIN — PHENYLEPHRINE HYDROCHLORIDE 50 MCG: 10 INJECTION INTRAVENOUS at 08:04

## 2023-04-05 RX ADMIN — HYDROMORPHONE HYDROCHLORIDE 0.5 MG: 2 INJECTION, SOLUTION INTRAMUSCULAR; INTRAVENOUS; SUBCUTANEOUS at 10:04

## 2023-04-05 RX ADMIN — LIDOCAINE HYDROCHLORIDE 60 MG: 20 INJECTION, SOLUTION INTRAVENOUS at 08:04

## 2023-04-05 RX ADMIN — CEFAZOLIN 2 G: 1 INJECTION, POWDER, FOR SOLUTION INTRAMUSCULAR; INTRAVENOUS at 08:04

## 2023-04-05 RX ADMIN — PROPOFOL 20 MG: 10 INJECTION, EMULSION INTRAVENOUS at 08:04

## 2023-04-05 RX ADMIN — ROCURONIUM BROMIDE 30 MG: 10 SOLUTION INTRAVENOUS at 08:04

## 2023-04-05 RX ADMIN — METHOCARBAMOL 500 MG: 100 INJECTION, SOLUTION INTRAMUSCULAR; INTRAVENOUS at 10:04

## 2023-04-05 RX ADMIN — HEPARIN SODIUM 2000 UNITS: 1000 INJECTION, SOLUTION INTRAVENOUS; SUBCUTANEOUS at 08:04

## 2023-04-05 RX ADMIN — METHOCARBAMOL 500 MG: 100 INJECTION INTRAMUSCULAR; INTRAVENOUS at 10:04

## 2023-04-05 RX ADMIN — PROTAMINE SULFATE 50 MG: 10 INJECTION, SOLUTION INTRAVENOUS at 09:04

## 2023-04-05 RX ADMIN — HYDROMORPHONE HYDROCHLORIDE 0.5 MG: 2 INJECTION, SOLUTION INTRAMUSCULAR; INTRAVENOUS; SUBCUTANEOUS at 11:04

## 2023-04-05 RX ADMIN — HYDRALAZINE HYDROCHLORIDE 10 MG: 20 INJECTION INTRAMUSCULAR; INTRAVENOUS at 10:04

## 2023-04-05 RX ADMIN — KETOROLAC TROMETHAMINE 15 MG: 30 INJECTION, SOLUTION INTRAMUSCULAR; INTRAVENOUS at 10:04

## 2023-04-05 RX ADMIN — SUGAMMADEX 100 MG: 100 INJECTION, SOLUTION INTRAVENOUS at 09:04

## 2023-04-05 RX ADMIN — DIPHENHYDRAMINE HYDROCHLORIDE 25 MG: 50 INJECTION INTRAMUSCULAR; INTRAVENOUS at 10:04

## 2023-04-05 RX ADMIN — ONDANSETRON 4 MG: 2 INJECTION INTRAMUSCULAR; INTRAVENOUS at 09:04

## 2023-04-05 NOTE — ANESTHESIA PREPROCEDURE EVALUATION
04/05/2023  Katie Murphy is a 89 y.o., female.      EF 40%  Anemia, current HGB 8.4    No clinical bleeding reported.  Coags normal  Pre-op Assessment    I have reviewed the Patient Summary Reports.     I have reviewed the Nursing Notes. I have reviewed the NPO Status.   I have reviewed the Medications.     Review of Systems      Physical Exam  General: Well nourished, Cooperative, Alert and Oriented    Airway:  Mallampati: II   Mouth Opening: Normal  TM Distance: Normal  Tongue: Normal  Neck ROM: Normal ROM    Dental:  Dentures        Anesthesia Plan  Type of Anesthesia, risks & benefits discussed:    Anesthesia Type: Gen ETT  Intra-op Monitoring Plan: Standard ASA Monitors and Art Line  Post Op Pain Control Plan: multimodal analgesia and IV/PO Opioids PRN  Airway Plan: Direct, Post-Induction  Informed Consent: Informed consent signed with the Patient and all parties understand the risks and agree with anesthesia plan.  All questions answered. Patient consented to blood products? Yes  ASA Score: 3  Day of Surgery Review of History & Physical: H&P Update referred to the surgeon/provider.    Ready For Surgery From Anesthesia Perspective.     .    Avoid NMBs

## 2023-04-05 NOTE — ANESTHESIA PROCEDURE NOTES
Intubation    Date/Time: 4/5/2023 8:21 AM  Performed by: Sulema Mota CRNA  Authorized by: Ely Benjamin MD     Intubation:     Induction:  Intravenous    Intubated:  Postinduction    Mask Ventilation:  Easy with oral airway    Attempts:  1    Attempted By:  CRNA    Method of Intubation:  Direct    Blade:  Heart 2    Laryngeal View Grade: Grade I - full view of cords      Difficult Airway Encountered?: No      Complications:  None    Airway Device:  Oral endotracheal tube    Airway Device Size:  7.0    Style/Cuff Inflation:  Cuffed (inflated to minimal occlusive pressure)    Inflation Amount (mL):  7    Tube secured:  22    Secured at:  The lips    Placement Verified By:  Capnometry    Complicating Factors:  None    Findings Post-Intubation:  BS equal bilateral and atraumatic/condition of teeth unchanged

## 2023-04-05 NOTE — Clinical Note
DEVICE AMULET 25MM was inserted into and deployed to the left atrial appendage. Amulet device inserted and deployed under flouroscopy with MALU guidance and Amulet rep support.

## 2023-04-05 NOTE — TRANSFER OF CARE
"Anesthesia Transfer of Care Note    Patient: Katie Murphy    Procedure(s) Performed: Procedure(s) (LRB):  Left atrial appendage closure device (N/A)  Transesophageal echo (MALU) intra-procedure log documentation (N/A)    Patient location: PACU    Anesthesia Type: general    Transport from OR: Transported from OR on room air with adequate spontaneous ventilation    Post pain: adequate analgesia    Post assessment: no apparent anesthetic complications and tolerated procedure well    Post vital signs: stable    Nausea/Vomiting: no nausea/vomiting    Complications: none    Transfer of care protocol was followed      Last vitals:   Visit Vitals  BP (!) 157/91   Pulse 70   Temp 36.2 °C (97.2 °F)   Ht 5' 3" (1.6 m)   Wt 48.5 kg (106 lb 14.8 oz)   SpO2 (!) 93%   Breastfeeding No   BMI 18.94 kg/m²     "

## 2023-04-05 NOTE — NURSING
Nurses Note -- 4 Eyes      4/5/2023   6:22 PM      Skin assessed during: Admit      [x] No Pressure Injuries Present    []Prevention Measures Documented      [] Yes- Altered Skin Integrity Present or Discovered   [] LDA Added if Not in Epic (Describe Wound)   [] New Altered Skin Integrity was Present on Admit and Documented in LDA   [] Wound Image Taken    Wound Care Consulted? Yes    Attending Nurse:  Dony Nogueira RN     Second RN/Staff Member:  Jelena Ferris

## 2023-04-05 NOTE — NURSING
Stop Eliquis. Switch to Aspirin and Plavix daily. Prescriptions sent to [place] [facility] on [address] In [place], [place]. Per Dr. Brito.

## 2023-04-05 NOTE — Clinical Note
A venogram was performed in the IMANI. The vessel was injected via hand injection  with 10 mL of contrast. Contrast injection into IMANI for visualization, sizing, morphology of IMANI.

## 2023-04-05 NOTE — ANESTHESIA PROCEDURE NOTES
Arterial    Diagnosis: Atrial fibrillation    Patient location during procedure: done in OR    Staffing  Authorizing Provider: Ely Benjamin MD  Performing Provider: Ely Benjamin MD    Anesthesiologist was present at the time of the procedure.    Preanesthetic Checklist  Completed: patient identified, IV checked, site marked, risks and benefits discussed, surgical consent, monitors and equipment checked, pre-op evaluation, timeout performed and anesthesia consent givenArterial  Skin Prep: chlorhexidine gluconate  Local Infiltration: lidocaine  Orientation: right  Location: radial    Catheter Size: 20 G  Catheter placement by Anatomical landmarks. Heme positive aspiration all ports. Insertion Attempts: 1  Assessment  Dressing: secured with tape and tegaderm  Patient: Tolerated well

## 2023-04-05 NOTE — INTERVAL H&P NOTE
The patient has been examined and the H&P has been reviewed:    I concur with the findings and no changes have occurred since H&P was written.    Procedure risks, benefits and alternative options discussed and understood by patient/family.    We will proceed with an Amulet-Amplatzer device under general anesthesia.       Active Hospital Problems    Diagnosis  POA    *Paroxysmal atrial fibrillation [I48.0]  Yes      Resolved Hospital Problems   No resolved problems to display.

## 2023-04-05 NOTE — Clinical Note
All sedation and medication, monitoring/vitals per anesthesia- please see anesthesia record for all details.

## 2023-04-06 LAB
ABO + RH BLD: NORMAL
ABO + RH BLD: NORMAL
ANION GAP SERPL CALC-SCNC: 5 MEQ/L
AV INDEX (PROSTH): 0.64
AV MEAN GRADIENT: 5 MMHG
AV PEAK GRADIENT: 9 MMHG
AV VALVE AREA: 1.81 CM2
AV VELOCITY RATIO: 0.69
BASOPHILS # BLD AUTO: 0.01 X10(3)/MCL (ref 0–0.2)
BASOPHILS NFR BLD AUTO: 0.2 %
BLD PROD TYP BPU: NORMAL
BLD PROD TYP BPU: NORMAL
BLOOD UNIT EXPIRATION DATE: NORMAL
BLOOD UNIT EXPIRATION DATE: NORMAL
BLOOD UNIT TYPE CODE: 6200
BLOOD UNIT TYPE CODE: 6200
BSA FOR ECHO PROCEDURE: 1.47 M2
BUN SERPL-MCNC: 15.1 MG/DL (ref 9.8–20.1)
CALCIUM SERPL-MCNC: 8.2 MG/DL (ref 8.4–10.2)
CHLORIDE SERPL-SCNC: 110 MMOL/L (ref 98–107)
CO2 SERPL-SCNC: 21 MMOL/L (ref 23–31)
CREAT SERPL-MCNC: 1.01 MG/DL (ref 0.55–1.02)
CREAT/UREA NIT SERPL: 15
CROSSMATCH INTERPRETATION: NORMAL
CROSSMATCH INTERPRETATION: NORMAL
CV ECHO LV RWT: 0.48 CM
DISPENSE STATUS: NORMAL
DISPENSE STATUS: NORMAL
DOP CALC AO PEAK VEL: 1.48 M/S
DOP CALC AO VTI: 30.3 CM
DOP CALC LVOT AREA: 2.8 CM2
DOP CALC LVOT DIAMETER: 1.9 CM
DOP CALC LVOT PEAK VEL: 1.02 M/S
DOP CALC LVOT STROKE VOLUME: 54.69 CM3
DOP CALC MV VTI: 33.7 CM
DOP CALCLVOT PEAK VEL VTI: 19.3 CM
E WAVE DECELERATION TIME: 230 MSEC
E/A RATIO: 1.2
ECHO LV POSTERIOR WALL: 1.03 CM (ref 0.6–1.1)
EJECTION FRACTION: 55 %
EOSINOPHIL # BLD AUTO: 0.01 X10(3)/MCL (ref 0–0.9)
EOSINOPHIL NFR BLD AUTO: 0.2 %
ERYTHROCYTE [DISTWIDTH] IN BLOOD BY AUTOMATED COUNT: 14.2 % (ref 11.5–17)
FRACTIONAL SHORTENING: 26 % (ref 28–44)
GFR SERPLBLD CREATININE-BSD FMLA CKD-EPI: 53 MLS/MIN/1.73/M2
GLUCOSE SERPL-MCNC: 121 MG/DL (ref 82–115)
HCT VFR BLD AUTO: 22.8 % (ref 37–47)
HGB BLD-MCNC: 7.2 G/DL (ref 12–16)
IMM GRANULOCYTES # BLD AUTO: 0.02 X10(3)/MCL (ref 0–0.04)
IMM GRANULOCYTES NFR BLD AUTO: 0.5 %
INTERVENTRICULAR SEPTUM: 1.52 CM (ref 0.6–1.1)
LEFT INTERNAL DIMENSION IN SYSTOLE: 3.16 CM (ref 2.1–4)
LEFT VENTRICLE DIASTOLIC VOLUME INDEX: 54.93 ML/M2
LEFT VENTRICLE DIASTOLIC VOLUME: 81.3 ML
LEFT VENTRICLE MASS INDEX: 134 G/M2
LEFT VENTRICLE SYSTOLIC VOLUME INDEX: 26.8 ML/M2
LEFT VENTRICLE SYSTOLIC VOLUME: 39.7 ML
LEFT VENTRICULAR INTERNAL DIMENSION IN DIASTOLE: 4.26 CM (ref 3.5–6)
LEFT VENTRICULAR MASS: 199.04 G
LVOT MG: 2 MMHG
LVOT MV: 0.66 CM/S
LYMPHOCYTES # BLD AUTO: 0.57 X10(3)/MCL (ref 0.6–4.6)
LYMPHOCYTES NFR BLD AUTO: 13.3 %
MCH RBC QN AUTO: 29.4 PG (ref 27–31)
MCHC RBC AUTO-ENTMCNC: 31.6 G/DL (ref 33–36)
MCV RBC AUTO: 93.1 FL (ref 80–94)
MONOCYTES # BLD AUTO: 0.52 X10(3)/MCL (ref 0.1–1.3)
MONOCYTES NFR BLD AUTO: 12.1 %
MV MEAN GRADIENT: 3 MMHG
MV PEAK A VEL: 0.89 M/S
MV PEAK E VEL: 1.07 M/S
MV PEAK GRADIENT: 6 MMHG
MV STENOSIS PRESSURE HALF TIME: 63 MS
MV VALVE AREA BY CONTINUITY EQUATION: 1.62 CM2
MV VALVE AREA P 1/2 METHOD: 3.49 CM2
NEUTROPHILS # BLD AUTO: 3.15 X10(3)/MCL (ref 2.1–9.2)
NEUTROPHILS NFR BLD AUTO: 73.7 %
NRBC BLD AUTO-RTO: 0 %
PISA TR MAX VEL: 2.44 M/S
PLATELET # BLD AUTO: 250 X10(3)/MCL (ref 130–400)
PMV BLD AUTO: 9.9 FL (ref 7.4–10.4)
POTASSIUM SERPL-SCNC: 4 MMOL/L (ref 3.5–5.1)
PV PEAK VELOCITY: 1.07 CM/S
RA PRESSURE: 3 MMHG
RBC # BLD AUTO: 2.45 X10(6)/MCL (ref 4.2–5.4)
RIGHT VENTRICULAR END-DIASTOLIC DIMENSION: 2.41 CM
SODIUM SERPL-SCNC: 136 MMOL/L (ref 136–145)
TR MAX PG: 24 MMHG
TV REST PULMONARY ARTERY PRESSURE: 27 MMHG
UNIT NUMBER: NORMAL
UNIT NUMBER: NORMAL
WBC # SPEC AUTO: 4.3 X10(3)/MCL (ref 4.5–11.5)

## 2023-04-06 PROCEDURE — 25000003 PHARM REV CODE 250: Performed by: INTERNAL MEDICINE

## 2023-04-06 PROCEDURE — 80048 BASIC METABOLIC PNL TOTAL CA: CPT | Performed by: INTERNAL MEDICINE

## 2023-04-06 PROCEDURE — 21400001 HC TELEMETRY ROOM

## 2023-04-06 PROCEDURE — 63600175 PHARM REV CODE 636 W HCPCS: Performed by: INTERNAL MEDICINE

## 2023-04-06 PROCEDURE — 36430 TRANSFUSION BLD/BLD COMPNT: CPT

## 2023-04-06 PROCEDURE — 25000003 PHARM REV CODE 250: Performed by: NURSE PRACTITIONER

## 2023-04-06 PROCEDURE — 85025 COMPLETE CBC W/AUTO DIFF WBC: CPT | Performed by: INTERNAL MEDICINE

## 2023-04-06 PROCEDURE — P9016 RBC LEUKOCYTES REDUCED: HCPCS

## 2023-04-06 PROCEDURE — 27201598 HC CASSETTE, BLOOD WARMER

## 2023-04-06 RX ORDER — PRAVASTATIN SODIUM 10 MG/1
20 TABLET ORAL NIGHTLY
Status: DISCONTINUED | OUTPATIENT
Start: 2023-04-06 | End: 2023-04-07 | Stop reason: HOSPADM

## 2023-04-06 RX ORDER — LEVOTHYROXINE SODIUM 112 UG/1
112 TABLET ORAL EVERY MORNING
Status: DISCONTINUED | OUTPATIENT
Start: 2023-04-07 | End: 2023-04-07 | Stop reason: HOSPADM

## 2023-04-06 RX ORDER — LOSARTAN POTASSIUM 50 MG/1
50 TABLET ORAL DAILY
Status: DISCONTINUED | OUTPATIENT
Start: 2023-04-07 | End: 2023-04-07 | Stop reason: HOSPADM

## 2023-04-06 RX ORDER — RANOLAZINE 500 MG/1
500 TABLET, EXTENDED RELEASE ORAL 2 TIMES DAILY
Status: DISCONTINUED | OUTPATIENT
Start: 2023-04-06 | End: 2023-04-07 | Stop reason: HOSPADM

## 2023-04-06 RX ORDER — ISOSORBIDE MONONITRATE 10 MG/1
10 TABLET ORAL 2 TIMES DAILY
Status: DISCONTINUED | OUTPATIENT
Start: 2023-04-06 | End: 2023-04-07 | Stop reason: HOSPADM

## 2023-04-06 RX ORDER — GABAPENTIN 300 MG/1
300 CAPSULE ORAL 3 TIMES DAILY
Status: DISCONTINUED | OUTPATIENT
Start: 2023-04-06 | End: 2023-04-07 | Stop reason: HOSPADM

## 2023-04-06 RX ORDER — METOPROLOL SUCCINATE 50 MG/1
50 TABLET, EXTENDED RELEASE ORAL DAILY
Status: DISCONTINUED | OUTPATIENT
Start: 2023-04-07 | End: 2023-04-07 | Stop reason: HOSPADM

## 2023-04-06 RX ORDER — HYDROCODONE BITARTRATE AND ACETAMINOPHEN 500; 5 MG/1; MG/1
TABLET ORAL
Status: DISCONTINUED | OUTPATIENT
Start: 2023-04-06 | End: 2023-04-07 | Stop reason: HOSPADM

## 2023-04-06 RX ORDER — TRAZODONE HYDROCHLORIDE 50 MG/1
50 TABLET ORAL NIGHTLY
Status: DISCONTINUED | OUTPATIENT
Start: 2023-04-06 | End: 2023-04-07 | Stop reason: HOSPADM

## 2023-04-06 RX ORDER — ONDANSETRON 4 MG/1
4 TABLET, ORALLY DISINTEGRATING ORAL ONCE
Status: COMPLETED | OUTPATIENT
Start: 2023-04-06 | End: 2023-04-06

## 2023-04-06 RX ORDER — FAMOTIDINE 20 MG/1
40 TABLET, FILM COATED ORAL NIGHTLY
Status: DISCONTINUED | OUTPATIENT
Start: 2023-04-06 | End: 2023-04-07 | Stop reason: HOSPADM

## 2023-04-06 RX ADMIN — GABAPENTIN 300 MG: 300 CAPSULE ORAL at 10:04

## 2023-04-06 RX ADMIN — HYDRALAZINE HYDROCHLORIDE 10 MG: 20 INJECTION INTRAMUSCULAR; INTRAVENOUS at 10:04

## 2023-04-06 RX ADMIN — CLOPIDOGREL BISULFATE 75 MG: 75 TABLET ORAL at 10:04

## 2023-04-06 RX ADMIN — TRAZODONE HYDROCHLORIDE 50 MG: 50 TABLET ORAL at 10:04

## 2023-04-06 RX ADMIN — ONDANSETRON 4 MG: 4 TABLET, ORALLY DISINTEGRATING ORAL at 01:04

## 2023-04-06 RX ADMIN — ASPIRIN 81 MG: 81 TABLET, COATED ORAL at 10:04

## 2023-04-06 RX ADMIN — ISOSORBIDE MONONITRATE 10 MG: 10 TABLET ORAL at 10:04

## 2023-04-06 RX ADMIN — RANOLAZINE 500 MG: 500 TABLET, EXTENDED RELEASE ORAL at 10:04

## 2023-04-06 RX ADMIN — PRAVASTATIN SODIUM 20 MG: 10 TABLET ORAL at 10:04

## 2023-04-06 RX ADMIN — FAMOTIDINE 40 MG: 20 TABLET, FILM COATED ORAL at 10:04

## 2023-04-06 NOTE — PROGRESS NOTES
[facility] - Observation Unit  Cardiology  Progress Note    Patient Name: Katie Murphy  MRN: [id number]  Admission Date: 4/5/2023  Code Status: No Order   Attending Provider: Robert Calixto*   Primary Care Physician: Rayna Esteban MD  Principal Problem:Paroxysmal atrial fibrillation    Patient information was obtained from patient, past medical records, and ER records.     Subjective:     Chief Complaint:  Amulet implantation     HPI:   This is an 89-year-old female, who is known to Dr. Brito and Dr. Martell, with history of CAD, hypothyroidism, DVT, GERD, clotting disorder, myasthenia gravis, breast cancer, HLD, PAF.  She presented to [facility] on 04/05/2023 for left atrial appendage occlusion device.  Patient underwent successful Amulet implantation.  Right groin benign.  Patient was noted to be anemic therefore will be transfused 2 units of PRBC.  [facility] has admitted the patient for L AAA occlusion device implantation.    4.6.23: NAD Noted. VSS. SR on tele. Anemic this AM. Right groin benign.    PMH: CAD, hypothyroidism, DVT, GERD, clotting disorder, myasthenia gravis, breast cancer, HLD, PAF  PSH:  Breast lumpectomy, cholecystectomy, appendectomy  Social History:  Denies EtOH, tobacco, and illicit drug use  Family History:  Father-heart disease; sister-CAD/CABG, ppm placement    Previous Cardiac Diagnostics:   Carotid US 3.30.23  The study quality is good.   1-39% stenosis in the proximal right internal carotid artery based on Bluth Criteria.   1-39% stenosis in the proximal left internal carotid artery based on Bluth Criteria.   Antegrade right vertebral artery flow.   Antegrade left vertebral artery flow.     MALU 3.10.23  The left ventricle is normal in size with mildly decreased systolic function.  Left ventricular diastolic dysfunction.  The estimated ejection fraction is 40%.  Normal right ventricular size with normal right ventricular systolic function.  Moderate to severe left  atrial enlargement.  Mild-to-moderate mitral regurgitation.  Mild tricuspid regurgitation.  Mild pulmonic regurgitation.  No interatrial septal defect present.  Normal appearing left atrial appendage. A 0 degrees, the appendage measures 16 x 25 mm. At 45 degrees, the appendage measures 21 x 29 mm. At 90 degrees, the appendage measures 18 x21 mm. At 135 degrees, the appendage measures 14 x 20 mm. No thrombus is present in the appendage. IMANI occluder is absent. Normal appendage velocities.    PET 3.7.23  This is an abnormal perfusion study. Study is consistent with ischemia.   This scan is suggestive of low to moderate risk for future cardiovascular events.   Small reversible perfusion abnormality of mild intensity in the apical and the apical inferior segments. SSS 2 SRS 0.   The left ventricular cavity is noted to be normal on the stress studies. The stress left ventricular ejection fraction was calculated to be 75% and left ventricular global function is normal. The rest left ventricular cavity is noted to be normal. The rest left ventricular ejection fraction was calculated to be 64% and rest left ventricular global function is normal.   When compared to the resting ejection fraction (64%), the stress ejection fraction (75%) has increased.   The study quality is good.   There was a rise in myocardial blood flow between rest and stress.  Global myocardial blood flow reserve was 2.36.  Myocardial blood flow reserve is reduced in the apical territory which is suggestive of flow limiting stenosis in this territory.          Review of patient's allergies indicates:   Allergen Reactions    Sulfa (sulfonamide antibiotics) Swelling     mouth    Acetaminophen Nausea And Vomiting     Facial swelling        No current facility-administered medications on file prior to encounter.     Current Outpatient Medications on File Prior to Encounter   Medication Sig    amoxicillin (AMOXIL) 875 MG tablet Take 875 mg by mouth 2 (two)  times daily.    dimenhyDRINATE (DRAMAMINE) 50 MG tablet Take 1 tablet by mouth daily as needed.    famotidine (PEPCID) 40 MG tablet Take 40 mg by mouth every evening.    gabapentin (NEURONTIN) 300 MG capsule Take 300 mg by mouth 3 (three) times daily.    isosorbide mononitrate (ISMO,MONOKET) 10 mg tablet Take 10 mg by mouth 2 (two) times daily.    levothyroxine (SYNTHROID) 112 MCG tablet Take 112 mcg by mouth every morning.    metoprolol succinate (TOPROL-XL) 50 MG 24 hr tablet Take 50 mg by mouth once daily.    nitroGLYCERIN (NITROSTAT) 0.4 MG SL tablet Place 1 tablet under the tongue daily as needed. Take 1 Tablet every 5 minutes up to 3 in 15 minutes as needed for chest pain.    olmesartan (BENICAR) 20 MG tablet Take 20 mg by mouth once daily.    pravastatin (PRAVACHOL) 20 MG tablet Take 20 mg by mouth every evening.    ranolazine (RANEXA) 500 MG Tb12 Take 500 mg by mouth 2 (two) times daily.    traZODone (DESYREL) 50 MG tablet Take 50 mg by mouth every evening.         Review of Systems   Constitutional: Negative for chills and fever.   Cardiovascular:  Negative for chest pain and palpitations.   Respiratory:  Negative for shortness of breath.    Psychiatric/Behavioral:  Negative for altered mental status.    Objective:     Vital Signs (Most Recent):  Temp: 98.2 °F (36.8 °C) (04/06/23 0732)  Pulse: 74 (04/06/23 0732)  Resp: 18 (04/06/23 0732)  BP: (!) 152/87 (04/06/23 0732)  SpO2: 95 % (04/06/23 0732)   Vital Signs (24h Range):  Temp:  [97.2 °F (36.2 °C)-99.1 °F (37.3 °C)] 98.2 °F (36.8 °C)  Pulse:  [69-81] 74  Resp:  [16-18] 18  SpO2:  [92 %-100 %] 95 %  BP: ()/(50-96) 152/87     Weight: 48.5 kg (106 lb 14.8 oz)  Body mass index is 18.94 kg/m².    SpO2: 95 %       No intake or output data in the 24 hours ending 04/06/23 0913    Lines/Drains/Airways       Arterial Line  Duration             Arterial Line 04/05/23 1230 Right Radial <1 day              Peripheral Intravenous Line  Duration                   Peripheral IV - Single Lumen 04/05/23 0715 22 G Anterior;Right Upper Arm 1 day         Peripheral IV - Single Lumen 04/05/23 0800 20 G Right Hand 1 day                    Physical Exam  Constitutional:       Appearance: Normal appearance.   HENT:      Head: Normocephalic.   Eyes:      Extraocular Movements: Extraocular movements intact.      Conjunctiva/sclera: Conjunctivae normal.   Cardiovascular:      Rate and Rhythm: Normal rate and regular rhythm.      Pulses: Normal pulses.      Heart sounds: Normal heart sounds.   Pulmonary:      Effort: Pulmonary effort is normal.      Breath sounds: Normal breath sounds.   Abdominal:      General: Abdomen is flat.   Musculoskeletal:         General: Normal range of motion.      Cervical back: Normal range of motion and neck supple.   Skin:     General: Skin is warm and dry.      Comments: R Groin Soft/Flat, Non-Tender, No Sign of Bleed/Infection. +2 BLE Palpable Pedal Pulses     Neurological:      Mental Status: She is alert and oriented to person, place, and time.       Significant Labs: CMP   Recent Labs   Lab 04/06/23  0424      K 4.0   CO2 21*   BUN 15.1   CREATININE 1.01   CALCIUM 8.2*    and CBC   Recent Labs   Lab 04/06/23  0424   WBC 4.3*   HGB 7.2*   HCT 22.8*          Significant Imaging:     Assessment and Plan:     See below MDM formulated by me (Darvin Alvarado MD):    IMPRESSION:  PAF/Amulet implantation  -[id number] 6, HASBLED 4  Anemia  CAD  HTN  HLD  Hx of DVT  Myasthenia Gravis  Frequent falls, reason for Amulet  Hx of TIA    PLAN  Transfuse 2 units PRBC  Echo pending  Continue DAPT  Continue home medications  Right groin precautions/activity restrictions  DC home in AM if doing well    Case and plan discussed with Dr. Daryl Villalta, [facility] and Darvin Alvarado MD  Cardiology  [facility] - Observation Unit  04/06/2023 9:13 AM

## 2023-04-06 NOTE — PLAN OF CARE
04/06/23 1628   Discharge Assessment   Confirmed Demographics Correct on Facesheet   Source of Information patient   Does patient/caregiver understand observation status   (inpatient)   Reason For Admission a fib   People in Home alone   Facility Arrived From: home   Do you expect to return to your current living situation? Yes   Do you have help at home or someone to help you manage your care at home? No   Prior to hospitilization cognitive status: Unable to Assess   Current cognitive status: Alert/Oriented   Equipment Currently Used at Home none   Readmission within 30 days? No   Patient currently being followed by outpatient case management? No   Do you currently have service(s) that help you manage your care at home? No   Do you take prescription medications? Yes   Do you have prescription coverage? Yes   Coverage [employer]   Do you have any problems affording any of your prescribed medications? No   Who is going to help you get home at discharge? son   How do you get to doctors appointments? car, drives self   Are you on dialysis? No   Discharge Plan A Home   Discharge Plan B Home   DME Needed Upon Discharge  none   Discharge Plan discussed with: Patient   Discharge Barriers Identified None   Financial Resource Strain   How hard is it for you to pay for the very basics like food, housing, medical care, and heating? Not hard   Housing Stability   In the last 12 months, was there a time when you were not able to pay the mortgage or rent on time? N   In the last 12 months, was there a time when you did not have a steady place to sleep or slept in a shelter (including now)? N   Transportation Needs   In the past 12 months, has lack of transportation kept you from meetings, work, or from getting things needed for daily living? No     Spoke to son he resides,in [place], mom is quite independent cont. To drive.  Has lots of friends.  She refused all help  .  [facility]   Will disch home

## 2023-04-07 VITALS
TEMPERATURE: 98 F | OXYGEN SATURATION: 95 % | HEART RATE: 72 BPM | WEIGHT: 106.94 LBS | BODY MASS INDEX: 18.95 KG/M2 | RESPIRATION RATE: 16 BRPM | SYSTOLIC BLOOD PRESSURE: 154 MMHG | HEIGHT: 63 IN | DIASTOLIC BLOOD PRESSURE: 78 MMHG

## 2023-04-07 LAB
ALBUMIN SERPL-MCNC: 2.9 G/DL (ref 3.4–4.8)
ALBUMIN/GLOB SERPL: 1.4 RATIO (ref 1.1–2)
ALP SERPL-CCNC: 59 UNIT/L (ref 40–150)
ALT SERPL-CCNC: 5 UNIT/L (ref 0–55)
AST SERPL-CCNC: 17 UNIT/L (ref 5–34)
BASOPHILS # BLD AUTO: 0.01 X10(3)/MCL (ref 0–0.2)
BASOPHILS NFR BLD AUTO: 0.3 %
BILIRUBIN DIRECT+TOT PNL SERPL-MCNC: 1.1 MG/DL
BUN SERPL-MCNC: 14.1 MG/DL (ref 9.8–20.1)
CALCIUM SERPL-MCNC: 8.5 MG/DL (ref 8.4–10.2)
CHLORIDE SERPL-SCNC: 111 MMOL/L (ref 98–107)
CO2 SERPL-SCNC: 20 MMOL/L (ref 23–31)
CREAT SERPL-MCNC: 0.82 MG/DL (ref 0.55–1.02)
EOSINOPHIL # BLD AUTO: 0.02 X10(3)/MCL (ref 0–0.9)
EOSINOPHIL NFR BLD AUTO: 0.7 %
ERYTHROCYTE [DISTWIDTH] IN BLOOD BY AUTOMATED COUNT: 15.1 % (ref 11.5–17)
GFR SERPLBLD CREATININE-BSD FMLA CKD-EPI: >60 MLS/MIN/1.73/M2
GLOBULIN SER-MCNC: 2.1 GM/DL (ref 2.4–3.5)
GLUCOSE SERPL-MCNC: 103 MG/DL (ref 82–115)
HCT VFR BLD AUTO: 30 % (ref 37–47)
HGB BLD-MCNC: 9.9 G/DL (ref 12–16)
IMM GRANULOCYTES # BLD AUTO: 0.02 X10(3)/MCL (ref 0–0.04)
IMM GRANULOCYTES NFR BLD AUTO: 0.7 %
LYMPHOCYTES # BLD AUTO: 0.42 X10(3)/MCL (ref 0.6–4.6)
LYMPHOCYTES NFR BLD AUTO: 13.8 %
MCH RBC QN AUTO: 29.6 PG (ref 27–31)
MCHC RBC AUTO-ENTMCNC: 33 G/DL (ref 33–36)
MCV RBC AUTO: 89.6 FL (ref 80–94)
MONOCYTES # BLD AUTO: 0.46 X10(3)/MCL (ref 0.1–1.3)
MONOCYTES NFR BLD AUTO: 15.1 %
NEUTROPHILS # BLD AUTO: 2.11 X10(3)/MCL (ref 2.1–9.2)
NEUTROPHILS NFR BLD AUTO: 69.4 %
NRBC BLD AUTO-RTO: 0 %
PLATELET # BLD AUTO: 251 X10(3)/MCL (ref 130–400)
PMV BLD AUTO: 9.9 FL (ref 7.4–10.4)
POTASSIUM SERPL-SCNC: 3.7 MMOL/L (ref 3.5–5.1)
PROT SERPL-MCNC: 5 GM/DL (ref 5.8–7.6)
RBC # BLD AUTO: 3.35 X10(6)/MCL (ref 4.2–5.4)
SODIUM SERPL-SCNC: 140 MMOL/L (ref 136–145)
WBC # SPEC AUTO: 3 X10(3)/MCL (ref 4.5–11.5)

## 2023-04-07 PROCEDURE — 25000003 PHARM REV CODE 250: Performed by: INTERNAL MEDICINE

## 2023-04-07 PROCEDURE — 80053 COMPREHEN METABOLIC PANEL: CPT | Performed by: NURSE PRACTITIONER

## 2023-04-07 PROCEDURE — 25000003 PHARM REV CODE 250: Performed by: NURSE PRACTITIONER

## 2023-04-07 PROCEDURE — 85025 COMPLETE CBC W/AUTO DIFF WBC: CPT | Performed by: NURSE PRACTITIONER

## 2023-04-07 RX ORDER — CLOPIDOGREL BISULFATE 75 MG/1
75 TABLET ORAL DAILY
Qty: 30 TABLET | Refills: 11 | Status: SHIPPED | OUTPATIENT
Start: 2023-04-07 | End: 2024-04-06

## 2023-04-07 RX ORDER — ASPIRIN 81 MG/1
81 TABLET ORAL DAILY
Qty: 90 TABLET | Refills: 3 | Status: SHIPPED | OUTPATIENT
Start: 2023-04-07 | End: 2024-04-06

## 2023-04-07 RX ADMIN — LOSARTAN POTASSIUM 50 MG: 50 TABLET, FILM COATED ORAL at 09:04

## 2023-04-07 RX ADMIN — CLOPIDOGREL BISULFATE 75 MG: 75 TABLET ORAL at 09:04

## 2023-04-07 RX ADMIN — ISOSORBIDE MONONITRATE 10 MG: 10 TABLET ORAL at 09:04

## 2023-04-07 RX ADMIN — RANOLAZINE 500 MG: 500 TABLET, EXTENDED RELEASE ORAL at 09:04

## 2023-04-07 RX ADMIN — LEVOTHYROXINE SODIUM 112 MCG: 112 TABLET ORAL at 07:04

## 2023-04-07 RX ADMIN — GABAPENTIN 300 MG: 300 CAPSULE ORAL at 09:04

## 2023-04-07 RX ADMIN — METOPROLOL SUCCINATE 50 MG: 50 TABLET, EXTENDED RELEASE ORAL at 09:04

## 2023-04-07 RX ADMIN — ASPIRIN 81 MG: 81 TABLET, COATED ORAL at 09:04

## 2023-04-07 NOTE — DISCHARGE SUMMARY
[facility] - Observation Unit  Cardiology  Discharge Summary    Patient Name: Katie Murphy  MRN: [id number]  Admission Date: 4/5/2023  Code Status: No Order   Attending Provider: Robert Calixto*   Primary Care Physician: Rayna Esteban MD  Principal Problem:Paroxysmal atrial fibrillation    Patient information was obtained from patient, past medical records, and ER records.     Subjective:     Chief Complaint:  Amulet implantation     HPI:   This is an 89-year-old female, who is known to Dr. Brito and Dr. Martell, with history of CAD, hypothyroidism, DVT, GERD, clotting disorder, myasthenia gravis, breast cancer, HLD, PAF.  She presented to [facility] on 04/05/2023 for left atrial appendage occlusion device.  Patient underwent successful Amulet implantation.  Right groin benign.  Patient was noted to be anemic therefore will be transfused 2 units of PRBC.  H&H improved post transfusion. Patient is stable for DC. Keep scheduled F/U appt with Dr. Brito.    4.6.23: NAD Noted. VSS. SR on tele. Anemic this AM. Right groin benign.    PMH: CAD, hypothyroidism, DVT, GERD, clotting disorder, myasthenia gravis, breast cancer, HLD, PAF  PSH:  Breast lumpectomy, cholecystectomy, appendectomy  Social History:  Denies EtOH, tobacco, and illicit drug use  Family History:  Father-heart disease; sister-CAD/CABG, ppm placement    Previous Cardiac Diagnostics:   Carotid US 3.30.23  The study quality is good.   1-39% stenosis in the proximal right internal carotid artery based on Bluth Criteria.   1-39% stenosis in the proximal left internal carotid artery based on Bluth Criteria.   Antegrade right vertebral artery flow.   Antegrade left vertebral artery flow.     MALU 3.10.23  The left ventricle is normal in size with mildly decreased systolic function.  Left ventricular diastolic dysfunction.  The estimated ejection fraction is 40%.  Normal right ventricular size with normal right ventricular systolic  function.  Moderate to severe left atrial enlargement.  Mild-to-moderate mitral regurgitation.  Mild tricuspid regurgitation.  Mild pulmonic regurgitation.  No interatrial septal defect present.  Normal appearing left atrial appendage. A 0 degrees, the appendage measures 16 x 25 mm. At 45 degrees, the appendage measures 21 x 29 mm. At 90 degrees, the appendage measures 18 x21 mm. At 135 degrees, the appendage measures 14 x 20 mm. No thrombus is present in the appendage. IMANI occluder is absent. Normal appendage velocities.    PET 3.7.23  This is an abnormal perfusion study. Study is consistent with ischemia.   This scan is suggestive of low to moderate risk for future cardiovascular events.   Small reversible perfusion abnormality of mild intensity in the apical and the apical inferior segments. SSS 2 SRS 0.   The left ventricular cavity is noted to be normal on the stress studies. The stress left ventricular ejection fraction was calculated to be 75% and left ventricular global function is normal. The rest left ventricular cavity is noted to be normal. The rest left ventricular ejection fraction was calculated to be 64% and rest left ventricular global function is normal.   When compared to the resting ejection fraction (64%), the stress ejection fraction (75%) has increased.   The study quality is good.   There was a rise in myocardial blood flow between rest and stress.  Global myocardial blood flow reserve was 2.36.  Myocardial blood flow reserve is reduced in the apical territory which is suggestive of flow limiting stenosis in this territory.          Review of patient's allergies indicates:   Allergen Reactions    Sulfa (sulfonamide antibiotics) Swelling     mouth    Acetaminophen Nausea And Vomiting     Facial swelling        No current facility-administered medications on file prior to encounter.     Current Outpatient Medications on File Prior to Encounter   Medication Sig    amoxicillin (AMOXIL) 875 MG  tablet Take 875 mg by mouth 2 (two) times daily.    dimenhyDRINATE (DRAMAMINE) 50 MG tablet Take 1 tablet by mouth daily as needed.    famotidine (PEPCID) 40 MG tablet Take 40 mg by mouth every evening.    gabapentin (NEURONTIN) 300 MG capsule Take 300 mg by mouth 3 (three) times daily.    isosorbide mononitrate (ISMO,MONOKET) 10 mg tablet Take 10 mg by mouth 2 (two) times daily.    levothyroxine (SYNTHROID) 112 MCG tablet Take 112 mcg by mouth every morning.    metoprolol succinate (TOPROL-XL) 50 MG 24 hr tablet Take 50 mg by mouth once daily.    nitroGLYCERIN (NITROSTAT) 0.4 MG SL tablet Place 1 tablet under the tongue daily as needed. Take 1 Tablet every 5 minutes up to 3 in 15 minutes as needed for chest pain.    olmesartan (BENICAR) 20 MG tablet Take 20 mg by mouth once daily.    pravastatin (PRAVACHOL) 20 MG tablet Take 20 mg by mouth every evening.    ranolazine (RANEXA) 500 MG Tb12 Take 500 mg by mouth 2 (two) times daily.    traZODone (DESYREL) 50 MG tablet Take 50 mg by mouth every evening.         Review of Systems   Constitutional: Negative for chills and fever.   Cardiovascular:  Negative for chest pain and palpitations.   Respiratory:  Negative for shortness of breath.    Psychiatric/Behavioral:  Negative for altered mental status.    Objective:     Vital Signs (Most Recent):  Temp: 98 °F (36.7 °C) (04/07/23 0736)  Pulse: 72 (04/07/23 0736)  Resp: 16 (04/07/23 0736)  BP: (!) 154/78 (04/07/23 0736)  SpO2: 95 % (04/07/23 0736)   Vital Signs (24h Range):  Temp:  [97.7 °F (36.5 °C)-98.7 °F (37.1 °C)] 98 °F (36.7 °C)  Pulse:  [69-76] 72  Resp:  [16-20] 16  SpO2:  [95 %-98 %] 95 %  BP: (123-176)/(66-91) 154/78     Weight: 48.5 kg (106 lb 14.8 oz)  Body mass index is 18.94 kg/m².    SpO2: 95 %         Intake/Output Summary (Last 24 hours) at 4/7/2023 0846  Last data filed at 4/7/2023 0500  Gross per 24 hour   Intake 1779.58 ml   Output --   Net 1779.58 ml       Lines/Drains/Airways       Peripheral  Intravenous Line  Duration                  Peripheral IV - Single Lumen 04/05/23 0715 22 G Anterior;Right Upper Arm 2 days                    Physical Exam  Constitutional:       Appearance: Normal appearance.   HENT:      Head: Normocephalic.   Eyes:      Extraocular Movements: Extraocular movements intact.      Conjunctiva/sclera: Conjunctivae normal.   Cardiovascular:      Rate and Rhythm: Normal rate and regular rhythm.      Pulses: Normal pulses.      Heart sounds: Normal heart sounds.   Pulmonary:      Effort: Pulmonary effort is normal.      Breath sounds: Normal breath sounds.   Abdominal:      General: Abdomen is flat.   Musculoskeletal:         General: Normal range of motion.      Cervical back: Normal range of motion and neck supple.   Skin:     General: Skin is warm and dry.      Comments: R Groin Soft/Flat, Non-Tender, No Sign of Bleed/Infection. +2 BLE Palpable Pedal Pulses     Neurological:      Mental Status: She is alert and oriented to person, place, and time.       Significant Labs: CMP   Recent Labs   Lab 04/06/23  0424 04/07/23  0341    140   K 4.0 3.7   CO2 21* 20*   BUN 15.1 14.1   CREATININE 1.01 0.82   CALCIUM 8.2* 8.5   ALBUMIN  --  2.9*   BILITOT  --  1.1   ALKPHOS  --  59   AST  --  17   ALT  --  5      and CBC   Recent Labs   Lab 04/06/23  0424 04/07/23  0341   WBC 4.3* 3.0*   HGB 7.2* 9.9*   HCT 22.8* 30.0*    251         Significant Imaging:     Assessment and Plan:     IMPRESSION:  PAF/Amulet implantation  -[id number] 6, HASBLED 4  Anemia  CAD  HTN  HLD  Hx of DVT  Myasthenia Gravis  Frequent falls, reason for Amulet  Hx of TIA    PLAN See below MDM formulated by me (Primo Marroquin MD):     DC home today  Continue DAPT  Continue home medications  Right groin precautions/activity restrictions    DISCHARGE PLAN:  Discharge: Home  Discharge Diet: Cardiac, Low Sodium  Discharge Condition: Stable  Discharge Activity:  As Tolerated, No Heavy Lifting > 5 lbs., Notify MD with  Symptoms of Bleed/Infection  Discharge Time: 36 minutes    Discharge Medications:     Medication List        START taking these medications      aspirin 81 MG EC tablet  Commonly known as: ECOTRIN  Take 1 tablet (81 mg total) by mouth once daily.     clopidogreL 75 mg tablet  Commonly known as: PLAVIX  Take 1 tablet (75 mg total) by mouth once daily.            CONTINUE taking these medications      dimenhyDRINATE 50 MG tablet  Commonly known as: DRAMAMINE     famotidine 40 MG tablet  Commonly known as: PEPCID     gabapentin 300 MG capsule  Commonly known as: NEURONTIN     isosorbide mononitrate 10 mg tablet  Commonly known as: ISMO,MONOKET     levothyroxine 112 MCG tablet  Commonly known as: SYNTHROID     metoprolol succinate 50 MG 24 hr tablet  Commonly known as: TOPROL-XL     nitroGLYCERIN 0.4 MG SL tablet  Commonly known as: NITROSTAT     olmesartan 20 MG tablet  Commonly known as: BENICAR     pravastatin 20 MG tablet  Commonly known as: PRAVACHOL     ranolazine 500 MG Tb12  Commonly known as: RANEXA     traZODone 50 MG tablet  Commonly known as: DESYREL            STOP taking these medications      amoxicillin 875 MG tablet  Commonly known as: AMOXIL               Where to Get Your Medications        These medications were sent to [id number] - [place] - [address] AT [facility] [address] [address]      Phone: [phone]   aspirin 81 MG EC tablet  clopidogreL 75 mg tablet           TACO ParryP-BC and Primo Marroquin MD  Cardiology  [facility] - Observation Unit  04/07/2023 9:13 AM

## 2023-04-07 NOTE — NURSING
Pt DC per MD order. PIV removed and covered with gauze and tape. DC instructions reviewed with pt and son. Prescriptions sent to preferred pharmacy. Pt transported downstairs via hospital transport. Pt in stable condition.

## 2023-04-09 LAB — ANTIBODY IDENTIFICATION: NORMAL

## 2023-04-10 NOTE — ANESTHESIA POSTPROCEDURE EVALUATION
Anesthesia Post Evaluation    Patient: Katie Murphy    Procedure(s) Performed: Procedure(s) (LRB):  Left atrial appendage closure device (N/A)  Transesophageal echo (MALU) intra-procedure log documentation (N/A)    Final Anesthesia Type: general      Patient location during evaluation: PACU  Patient participation: Yes- Able to Participate  Level of consciousness: awake and alert  Post-procedure vital signs: reviewed and stable  Pain management: adequate  Airway patency: patent      Anesthetic complications: no      Cardiovascular status: hemodynamically stable  Respiratory status: unassisted  Hydration status: euvolemic  Follow-up not needed.          Vitals Value Taken Time   /78 04/07/23 0736   Temp 36.7 °C (98 °F) 04/07/23 0736   Pulse 72 04/07/23 0736   Resp 16 04/07/23 0736   SpO2 95 % 04/07/23 0736         Event Time   Out of Recovery 15:16:29         Pain/Faith Score: No data recorded

## 2023-04-12 ENCOUNTER — PATIENT OUTREACH (OUTPATIENT)
Dept: ADMINISTRATIVE | Facility: CLINIC | Age: 88
End: 2023-04-12
Payer: MEDICARE

## 2023-04-12 NOTE — PROGRESS NOTES
C3 nurse attempted to contact Katie Murphy  for a TCC post hospital discharge follow up call. The patient is unable to conduct the call @ this time. The patient requested a callback.    The patient does not have a scheduled HOSFU appointment within 5-7 days post hospital discharge date 4/7/23.

## 2023-04-12 NOTE — PROGRESS NOTES
C3 nurse spoke with Katie Murphy  for a TCC post hospital discharge follow up call. The patient does not have a scheduled HOSFU appointment with Rayna Esteban MD  within 5-7 days post hospital discharge date 4/7/23. C3 nurse was unable to schedule HOSFU appointment in [facility].Pt stated she had to cancel appt on 4/11/23 with pcp, but she's going to contact the office to get rescheduled.

## 2023-05-01 DIAGNOSIS — E28.39 ESTROGEN DEFICIENCY: Primary | ICD-10-CM

## 2023-05-11 ENCOUNTER — HOSPITAL ENCOUNTER (OUTPATIENT)
Dept: RADIOLOGY | Facility: HOSPITAL | Age: 88
Discharge: HOME | End: 2023-05-11
Attending: NURSE PRACTITIONER
Payer: MEDICARE

## 2023-05-11 DIAGNOSIS — E28.39 ESTROGEN DEFICIENCY: ICD-10-CM

## 2023-05-11 PROCEDURE — 77080 DXA BONE DENSITY AXIAL: CPT | Mod: TC

## 2023-05-15 ENCOUNTER — LAB VISIT (OUTPATIENT)
Dept: LAB | Facility: HOSPITAL | Age: 88
End: 2023-05-15
Attending: NURSE PRACTITIONER
Payer: MEDICARE

## 2023-05-15 DIAGNOSIS — E87.6 LOW SERUM POTASSIUM LEVEL: Primary | ICD-10-CM

## 2023-05-15 LAB — POTASSIUM SERPL-SCNC: 4.4 MMOL/L (ref 3.5–5.1)

## 2023-05-15 PROCEDURE — 36415 COLL VENOUS BLD VENIPUNCTURE: CPT

## 2023-05-15 PROCEDURE — 84132 ASSAY OF SERUM POTASSIUM: CPT

## 2023-05-26 ENCOUNTER — ANESTHESIA (OUTPATIENT)
Dept: CARDIOLOGY | Facility: HOSPITAL | Age: 88
End: 2023-05-26
Payer: MEDICARE

## 2023-05-26 ENCOUNTER — ANESTHESIA EVENT (OUTPATIENT)
Dept: CARDIOLOGY | Facility: HOSPITAL | Age: 88
End: 2023-05-26
Payer: MEDICARE

## 2023-05-26 ENCOUNTER — HOSPITAL ENCOUNTER (OUTPATIENT)
Dept: CARDIOLOGY | Facility: HOSPITAL | Age: 88
Discharge: HOME | End: 2023-05-26
Attending: INTERNAL MEDICINE
Payer: MEDICARE

## 2023-05-26 VITALS
OXYGEN SATURATION: 96 % | BODY MASS INDEX: 19.72 KG/M2 | SYSTOLIC BLOOD PRESSURE: 143 MMHG | TEMPERATURE: 99 F | DIASTOLIC BLOOD PRESSURE: 79 MMHG | HEART RATE: 70 BPM | WEIGHT: 111.31 LBS | HEIGHT: 63 IN | RESPIRATION RATE: 17 BRPM

## 2023-05-26 DIAGNOSIS — I48.19 PERSISTENT ATRIAL FIBRILLATION: ICD-10-CM

## 2023-05-26 DIAGNOSIS — I48.91 ATRIAL FIBRILLATION: ICD-10-CM

## 2023-05-26 DIAGNOSIS — I48.19 PERSISTENT ATRIAL FIBRILLATION: Primary | ICD-10-CM

## 2023-05-26 LAB
APTT PPP: 32.7 SECONDS (ref 23.2–33.7)
INR BLD: 1.04 (ref 0–1.3)
PROTHROMBIN TIME: 13.5 SECONDS (ref 12.5–14.5)

## 2023-05-26 PROCEDURE — D9220A PRA ANESTHESIA: Mod: CRNA,,, | Performed by: NURSE ANESTHETIST, CERTIFIED REGISTERED

## 2023-05-26 PROCEDURE — 25000003 PHARM REV CODE 250: Performed by: NURSE ANESTHETIST, CERTIFIED REGISTERED

## 2023-05-26 PROCEDURE — 85730 THROMBOPLASTIN TIME PARTIAL: CPT | Performed by: INTERNAL MEDICINE

## 2023-05-26 PROCEDURE — 85610 PROTHROMBIN TIME: CPT | Performed by: INTERNAL MEDICINE

## 2023-05-26 PROCEDURE — 37000008 HC ANESTHESIA 1ST 15 MINUTES

## 2023-05-26 PROCEDURE — 93325 DOPPLER ECHO COLOR FLOW MAPG: CPT

## 2023-05-26 PROCEDURE — 63600175 PHARM REV CODE 636 W HCPCS: Performed by: NURSE ANESTHETIST, CERTIFIED REGISTERED

## 2023-05-26 PROCEDURE — D9220A PRA ANESTHESIA: ICD-10-PCS | Mod: CRNA,,, | Performed by: NURSE ANESTHETIST, CERTIFIED REGISTERED

## 2023-05-26 PROCEDURE — D9220A PRA ANESTHESIA: Mod: ANES,,, | Performed by: ANESTHESIOLOGY

## 2023-05-26 PROCEDURE — D9220A PRA ANESTHESIA: ICD-10-PCS | Mod: ANES,,, | Performed by: ANESTHESIOLOGY

## 2023-05-26 PROCEDURE — 93005 ELECTROCARDIOGRAM TRACING: CPT

## 2023-05-26 RX ORDER — PROPOFOL 10 MG/ML
VIAL (ML) INTRAVENOUS
Status: DISCONTINUED | OUTPATIENT
Start: 2023-05-26 | End: 2023-05-26

## 2023-05-26 RX ORDER — SODIUM CHLORIDE 9 MG/ML
INJECTION, SOLUTION INTRAVENOUS CONTINUOUS
Status: DISCONTINUED | OUTPATIENT
Start: 2023-05-26 | End: 2023-05-27 | Stop reason: HOSPADM

## 2023-05-26 RX ORDER — LIDOCAINE HYDROCHLORIDE 20 MG/ML
INJECTION, SOLUTION EPIDURAL; INFILTRATION; INTRACAUDAL; PERINEURAL
Status: DISCONTINUED | OUTPATIENT
Start: 2023-05-26 | End: 2023-05-26

## 2023-05-26 RX ADMIN — PROPOFOL 50 MG: 10 INJECTION, EMULSION INTRAVENOUS at 01:05

## 2023-05-26 RX ADMIN — LIDOCAINE HYDROCHLORIDE 2 ML: 20 INJECTION, SOLUTION EPIDURAL; INFILTRATION; INTRACAUDAL; PERINEURAL at 01:05

## 2023-05-26 RX ADMIN — SODIUM CHLORIDE, SODIUM GLUCONATE, SODIUM ACETATE, POTASSIUM CHLORIDE AND MAGNESIUM CHLORIDE: 526; 502; 368; 37; 30 INJECTION, SOLUTION INTRAVENOUS at 01:05

## 2023-05-26 NOTE — DISCHARGE SUMMARY
[facility] - Cath Lab Pre/Post  Discharge Note  Short Stay    Transesophageal echo (MALU)      OUTCOME: Patient tolerated treatment/procedure well without complication and is now ready for discharge.  Patient had a MALU with no complications. Her Amplatzer/Amulet device is well seated with no leaks or thrombus.     DISPOSITION: Home or Self Care    FINAL DIAGNOSIS:  Paroxysmal atrial fibrillation    FOLLOWUP: In clinic    DISCHARGE INSTRUCTIONS:  No discharge procedures on file.      Clinical Reference Documents Added to Patient Instructions         Document    GENERAL ANESTHESIA DISCHARGE INSTRUCTIONS (ENGLISH)    TRANSESOPHAGEAL ECHOCARDIOGRAM (ENGLISH)            TIME SPENT ON DISCHARGE: 30 minutes

## 2023-05-26 NOTE — ANESTHESIA PREPROCEDURE EVALUATION
05/26/2023  Katie Murphy is a 89 y.o., female.      Pre-op Assessment    I have reviewed the Patient Summary Reports.     I have reviewed the Nursing Notes. I have reviewed the NPO Status.   I have reviewed the Medications.     Review of Systems  Anesthesia Hx:  No problems with previous Anesthesia    Social:  Non-Smoker    Cardiovascular:   Hypertension CAD   JONES    Neurological:   TIA, Neuromuscular Disease, Headaches        Physical Exam  General: Well nourished, Cooperative, Alert and Oriented    Airway:  Mallampati: II   Mouth Opening: Normal  TM Distance: Normal  Tongue: Normal  Neck ROM: Normal ROM    Dental:  Edentulous        Anesthesia Plan  Type of Anesthesia, risks & benefits discussed:    Anesthesia Type: Gen Natural Airway  Intra-op Monitoring Plan: Standard ASA Monitors  Post Op Pain Control Plan: multimodal analgesia  Induction:  IV  Informed Consent: Informed consent signed with the Patient and all parties understand the risks and agree with anesthesia plan.  All questions answered. Patient consented to blood products? Yes  ASA Score: 3  Day of Surgery Review of History & Physical: H&P Update referred to the surgeon/provider.    Ready For Surgery From Anesthesia Perspective.     .

## 2023-05-26 NOTE — H&P
Cardiology/ EP    Chief complaint:  S/p Amulet/Amplatzer device.     HPI:  Patient is a 88 yo female with PMHx of atrial fibrillation s/p Amulet/Amplatzer device, she is here for a MALU.   She reports no complaints.     Past Medical and Surgical History  Past Medical History:   Diagnosis Date    Anxiety     Arthritis     Ascending aorta dilatation     Asthma     Atrial fibrillation     Breast cancer     CAD (coronary artery disease)     Clotting disorder     JONES (dyspnea on exertion)     DVT (deep venous thrombosis)     Essential (primary) hypertension     GERD (gastroesophageal reflux disease)     Hypoglycemia     Hypothyroidism, unspecified     Insomnia     Migraines     Mixed hyperlipidemia     Myasthenia gravis without (acute) exacerbation     Pacemaker     PAF (paroxysmal atrial fibrillation)     TIA (transient ischemic attack)      Past Surgical History:   Procedure Laterality Date    APPENDECTOMY      BREAST LUMPECTOMY      CATARACT EXTRACTION      CHOLECYSTECTOMY      CLOSURE OF LEFT ATRIAL APPENDAGE USING DEVICE N/A 4/5/2023    Procedure: Left atrial appendage closure device;  Surgeon: Robert Winston MD;  Location: [facility] CATH LAB;  Service: Cardiology;  Laterality: N/A;  WATCHMAN W/ INTRA OP MALU    COLONOSCOPY      CORONARY STENT PLACEMENT  2018    ECHOCARDIOGRAM,TRANSESOPHAGEAL N/A 4/5/2023    Procedure: Transesophageal echo (MALU) intra-procedure log documentation;  Surgeon: Selina Diagnostic Provider;  Location: [facility] CATH LAB;  Service: Cardiology;  Laterality: N/A;    INSERTION OF PACEMAKER      PARTIAL HYSTERECTOMY       Family History   Problem Relation Age of Onset    Heart disease Father      Social History     Socioeconomic History    Marital status:    Tobacco Use    Smoking status: Never    Smokeless tobacco: Never   Substance and Sexual Activity    Alcohol use: Never    Drug use: Not Currently    Sexual activity: Not Currently     Social Determinants of Health     Financial  Resource Strain: Low Risk     Difficulty of Paying Living Expenses: Not hard at all   Transportation Needs: Unknown    Lack of Transportation (Non-Medical): No   Housing Stability: Unknown    Unable to Pay for Housing in the Last Year: No    Unstable Housing in the Last Year: No     Review of patient's allergies indicates:   Allergen Reactions    Sulfa (sulfonamide antibiotics) Swelling     mouth    Acetaminophen Nausea And Vomiting     Facial swelling          Review of Systems   Constitutional: Negative.   All other systems reviewed and are negative.  Vitals:    05/26/23 1101   BP: (!) 176/83   Pulse: 70   Temp: 97.8 °F (36.6 °C)     Physical Exam  Constitutional:       Appearance: Normal appearance.   HENT:      Head: Normocephalic and atraumatic.      Mouth/Throat:      Mouth: Mucous membranes are moist.      Pharynx: Oropharynx is clear.   Neck:      Vascular: No carotid bruit.   Cardiovascular:      Rate and Rhythm: Normal rate.   Pulmonary:      Effort: No respiratory distress.      Breath sounds: No stridor. No wheezing, rhonchi or rales.   Abdominal:      Palpations: Abdomen is soft.   Musculoskeletal:      Cervical back: No tenderness.   Skin:     General: Skin is warm and dry.   Neurological:      General: No focal deficit present.      Mental Status: She is alert and oriented to person, place, and time.           Impression:  There are no hospital problems to display for this patient.      Recommendations/Plan:  We will proceed with  a MALU to assess IMANI occlusion device implant.       5/26/2023  12:42 PM  (Portions of this report were dictated using voice recognition software. Content is subject to voice recognition errors).

## 2023-05-26 NOTE — TRANSFER OF CARE
"Anesthesia Transfer of Care Note    Patient: Katie Murphy    Procedure(s) Performed: * No procedures listed *    Patient location: Other: CVSS    Anesthesia Type: MAC    Transport from OR: Transported from OR on room air with adequate spontaneous ventilation    Post pain: adequate analgesia    Post assessment: no apparent anesthetic complications    Post vital signs: stable    Level of consciousness: sedated    Nausea/Vomiting: no nausea/vomiting    Complications: none    Transfer of care protocol was followed      Last vitals:   Visit Vitals  BP (!) 145/84   Pulse 70   Temp 37 °C (98.6 °F)   Resp 18   Ht 5' 3" (1.6 m)   Wt 50.5 kg (111 lb 5.3 oz)   SpO2 100%   Breastfeeding No   BMI 19.72 kg/m²     "

## 2023-05-28 NOTE — ANESTHESIA POSTPROCEDURE EVALUATION
Anesthesia Post Evaluation    Patient: Katie Murphy    Procedure(s) Performed: * No procedures listed *    Final Anesthesia Type: general      Patient location during evaluation: PACU  Patient participation: Yes- Able to Participate  Level of consciousness: awake and alert  Post-procedure vital signs: reviewed and stable  Pain management: adequate  Airway patency: patent      Anesthetic complications: no      Cardiovascular status: hemodynamically stable  Respiratory status: unassisted  Hydration status: euvolemic  Follow-up not needed.          Vitals Value Taken Time   /79 05/26/23 1500   Temp 36.7 05/27/23 2037   Pulse 70 05/26/23 1500   Resp 17 05/26/23 1500   SpO2 91 % 05/26/23 1456   Vitals shown include unvalidated device data.      No case tracking events are documented in the log.      Pain/Faith Score: No data recorded

## 2023-06-07 LAB
BSA FOR ECHO PROCEDURE: 1.5 M2
EJECTION FRACTION: 45 %

## 2023-09-24 ENCOUNTER — HOSPITAL ENCOUNTER (EMERGENCY)
Facility: HOSPITAL | Age: 88
Discharge: HOME | End: 2023-09-24
Attending: EMERGENCY MEDICINE
Payer: MEDICARE

## 2023-09-24 VITALS
BODY MASS INDEX: 17.89 KG/M2 | OXYGEN SATURATION: 97 % | TEMPERATURE: 99 F | DIASTOLIC BLOOD PRESSURE: 88 MMHG | HEIGHT: 63 IN | SYSTOLIC BLOOD PRESSURE: 156 MMHG | RESPIRATION RATE: 18 BRPM | WEIGHT: 101 LBS | HEART RATE: 64 BPM

## 2023-09-24 DIAGNOSIS — S50.12XA CONTUSION OF LEFT FOREARM, INITIAL ENCOUNTER: ICD-10-CM
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
## 2023-09-24 DIAGNOSIS — R52 PAIN: ICD-10-CM

## 2023-09-24 DIAGNOSIS — S00.03XA CONTUSION OF SCALP, INITIAL ENCOUNTER: ICD-10-CM

## 2023-09-24 PROCEDURE — 99284 EMERGENCY DEPT VISIT MOD MDM: CPT | Mod: 25

## 2023-09-24 NOTE — ED PROVIDER NOTES
Encounter Date: 9/24/2023       History     Chief Complaint   Patient presents with    Fall     fall on Thursday while getting in bed. C/o pain to back of head and to left forearm.      The patient is a 89-year-old female with history of AFib, coronary artery disease, anxiety, arthritis, who presents emergency department with her friend complaining of head and left forearm pain status post fall.  Patient states 2 days ago she was scooting herself back on her bed when she lost her balance and fell to the ground.  Patient refused to come to the hospital until today as she states she had other things today.  Patient denies any loss of consciousness, numbness tingling or any other associated symptoms.    The history is provided by the patient and a friend. No  was used.   Fall  The accident occurred two days ago. Fall occurred: While scooting back on her bed. She fell from an unknown height. She landed on A hard floor. The point of impact was the head (Left forearm). Pain location: head and left forearm. She was Ambulatory at the scene. There was No entrapment after the fall. There was No drug use involved in the accident. There was No alcohol use involved in the accident. Associated symptoms include headaches. Pertinent negatives include no neck pain, no back pain, no paresthesias, no paralysis, no visual change, no fever, no numbness, no abdominal pain, no bowel incontinence, no nausea, no hematuria, no hearing loss, no loss of consciousness and no tingling. She has tried nothing for the symptoms.     Review of patient's allergies indicates:   Allergen Reactions    Sulfa (sulfonamide antibiotics) Swelling     mouth    Acetaminophen Nausea And Vomiting     Facial swelling      Past Medical History:   Diagnosis Date    Anxiety     Arthritis     Ascending aorta dilatation     Asthma     Atrial fibrillation     Breast cancer     CAD (coronary artery disease)     Clotting disorder     JONES (dyspnea on  exertion)     DVT (deep venous thrombosis)     Essential (primary) hypertension     GERD (gastroesophageal reflux disease)     Hypoglycemia     Hypothyroidism, unspecified     Insomnia     Migraines     Mixed hyperlipidemia     Myasthenia gravis without (acute) exacerbation     Pacemaker     PAF (paroxysmal atrial fibrillation)     TIA (transient ischemic attack)      Past Surgical History:   Procedure Laterality Date    APPENDECTOMY      BREAST LUMPECTOMY      CATARACT EXTRACTION      CHOLECYSTECTOMY      CLOSURE OF LEFT ATRIAL APPENDAGE USING DEVICE N/A 4/5/2023    Procedure: Left atrial appendage closure device;  Surgeon: Robert Winston MD;  Location: [facility] CATH LAB;  Service: Cardiology;  Laterality: N/A;  WATCHMAN W/ INTRA OP MALU    COLONOSCOPY      CORONARY STENT PLACEMENT  2018    ECHOCARDIOGRAM,TRANSESOPHAGEAL N/A 4/5/2023    Procedure: Transesophageal echo (MALU) intra-procedure log documentation;  Surgeon: [facility] Diagnostic Provider;  Location: [facility] CATH LAB;  Service: Cardiology;  Laterality: N/A;    INSERTION OF PACEMAKER      PARTIAL HYSTERECTOMY       Family History   Problem Relation Age of Onset    Heart disease Father      Social History     Tobacco Use    Smoking status: Never    Smokeless tobacco: Never   Substance Use Topics    Alcohol use: Never    Drug use: Never     Review of Systems   Constitutional:  Negative for fever.   HENT:  Negative for sore throat.    Respiratory:  Negative for shortness of breath.    Cardiovascular:  Negative for chest pain.   Gastrointestinal:  Negative for abdominal pain, bowel incontinence and nausea.   Genitourinary:  Negative for dysuria and hematuria.   Musculoskeletal:  Positive for arthralgias and myalgias. Negative for back pain and neck pain.   Skin:  Negative for rash.   Neurological:  Positive for headaches. Negative for tingling, loss of consciousness, weakness, numbness and paresthesias.   Hematological:  Does not bruise/bleed easily.   All other  systems reviewed and are negative.      Physical Exam     Initial Vitals [09/24/23 1428]   BP Pulse Resp Temp SpO2   (!) 161/95 71 16 98.8 °F (37.1 °C) 96 %      MAP       --         Physical Exam    Constitutional: She appears well-developed and well-nourished. She is not diaphoretic. No distress.   HENT:   Head: Normocephalic and atraumatic.   Mouth/Throat: Oropharynx is clear and moist.   Left occipital scalp hematoma and tenderness to palpation, approximately 2 cm in size   Eyes: Conjunctivae and EOM are normal. No scleral icterus.   Neck: No tracheal deviation present.   Normal range of motion.  Cardiovascular:  Normal rate.           Pulmonary/Chest: No respiratory distress. She has no wheezes.   Abdominal: She exhibits no distension. There is no guarding.   Musculoskeletal:         General: No edema. Normal range of motion.      Cervical back: Normal range of motion.      Comments: Small area of ecchymosis and tenderness to medial left mid forearm over the extensor surface     Neurological: She is alert and oriented to person, place, and time. No cranial nerve deficit. GCS score is 15. GCS eye subscore is 4. GCS verbal subscore is 5. GCS motor subscore is 6.   Skin: Skin is dry. No pallor.   Psychiatric: She has a normal mood and affect. Her behavior is normal. Judgment and thought content normal.         ED Course   Procedures  Labs Reviewed - No data to display       Imaging Results              CT Cervical Spine Without Contrast (Final result)  Result time 09/24/23 15:39:14      Final result by Teo Liu MD (09/24/23 15:39:14)                   Impression:      1. No acute displaced fracture or traumatic malalignment of the cervical spine.  2. Patchy ground-glass opacities in the right upper lobe with small nodules measuring up to 5 mm.      Electronically signed by: Teo Liu  Date:    09/24/2023  Time:    15:39               Narrative:    EXAMINATION:  CT CERVICAL SPINE WITHOUT  CONTRAST    CLINICAL HISTORY:  fall, neck pain;    TECHNIQUE:  Low dose axial images, sagittal and coronal reformations were performed though the cervical spine.  Contrast was not administered.    COMPARISON:  CT 01/16/2023    FINDINGS:  Mild anterolisthesis of C7 on T1, proximally 2 mm.  Vertebral body heights are maintained.  No acute displaced fracture or aggressive osseous lesion.  Intervertebral disc height loss throughout the cervical spine, most advanced at C6-C7.  Multilevel cervical spondylosis with posterior disc osteophyte complexes, facet hypertrophy, and uncovertebral spurring resulting in at most mild central canal stenosis at C5-C6 as well as multilevel neural foraminal narrowing.    No prevertebral soft tissue swelling.  Pleuroparenchymal nodularity of the visualized lung apices with patchy ground-glass opacities in the right upper lobe and small nodules measuring up to 5 mm.                                       CT Head Without Contrast (Final result)  Result time 09/24/23 15:32:34      Final result by Teo Liu MD (09/24/23 15:32:34)                   Impression:      1. No acute intracranial abnormality.  2. Small area of scalp swelling/hematoma overlying the left occipital calvarium. No displaced calvarial fracture.      Electronically signed by: Teo Liu  Date:    09/24/2023  Time:    15:32               Narrative:    EXAMINATION:  CT HEAD WITHOUT CONTRAST    CLINICAL HISTORY:  Fall, left posterior scalp hematoma, on plavix;    TECHNIQUE:  Low dose axial CT images obtained throughout the head without intravenous contrast. Sagittal and coronal reconstructions were performed.    COMPARISON:  Radiograph 01/16/2023    FINDINGS:  Intracranial compartment:    Generalized cerebral volume loss.  No hydrocephalus.  No extra-axial blood or fluid collections.    Patchy hypoattenuation throughout the supratentorial white matter most commonly reflects chronic microvascular ischemic change.  No  acute parenchymal hemorrhage or evolving major vascular distribution infarct.  No intracranial mass effect or midline shift.    Skull/extracranial contents (limited evaluation): Small area of scalp swelling/hematoma overlying the left occipital calvarium.  No displaced calvarial fracture.  Mastoid air cells and paranasal sinuses are essentially clear.  Postsurgical changes of the globes.                                       X-Ray Forearm Left (Final result)  Result time 09/24/23 15:40:37      Final result by Teo Liu MD (09/24/23 15:40:37)                   Impression:      No acute osseous abnormality.      Electronically signed by: Teo Liu  Date:    09/24/2023  Time:    15:40               Narrative:    EXAMINATION:  XR FOREARM LEFT    CLINICAL HISTORY:  Pain, unspecified    TECHNIQUE:  AP and lateral views of the left forearm were performed.    COMPARISON:  None    FINDINGS:  No acute displaced fracture, dislocation, or osseous destructive process.  No focal soft tissue swelling or radiopaque retained foreign body identified.                                       Medications - No data to display  Medical Decision Making  Amount and/or Complexity of Data Reviewed  Radiology: ordered.               ED Course as of 09/24/23 1552   Sun Sep 24, 2023   1551 Patient's imaging without any acute traumatic abnormalities, this was discussed with her and her friend at bedside.  Patient's friend did ask if I can make a recommendation for [facility], I told her she needs to discuss this with her primary care provider tomorrow for which the patient and her friend are agreeable.  We will discharge home at this time. [AA]      ED Course User Index  [AA] Laura Mueller MD                    Clinical Impression:   Final diagnoses:  [R52] Pain  [W19.XXXA] Fall, initial encounter (Primary)  [S00.03XA] Contusion of scalp, initial encounter  [S50.12XA] Contusion of left forearm,  initial encounter        ED Disposition Condition    Discharge Stable          ED Prescriptions    None       Follow-up Information       Follow up With Specialties Details Why Contact Info    Perri Nix NP Family Medicine   [address] [place] [address] [id number] [phone]               Laura Mueller MD  09/24/23 6671

## 2023-10-04 ENCOUNTER — HOSPITAL ENCOUNTER (EMERGENCY)
Facility: HOSPITAL | Age: 88
Discharge: HOME | End: 2023-10-04
Attending: FAMILY MEDICINE
Payer: MEDICARE

## 2023-10-04 VITALS
TEMPERATURE: 98 F | RESPIRATION RATE: 22 BRPM | BODY MASS INDEX: 17.89 KG/M2 | DIASTOLIC BLOOD PRESSURE: 94 MMHG | HEIGHT: 63 IN | HEART RATE: 70 BPM | WEIGHT: 101 LBS | OXYGEN SATURATION: 98 % | SYSTOLIC BLOOD PRESSURE: 165 MMHG

## 2023-10-04 DIAGNOSIS — S09.90XA INJURY OF HEAD, INITIAL ENCOUNTER: Primary | ICD-10-CM

## 2023-10-04 PROCEDURE — 99284 EMERGENCY DEPT VISIT MOD MDM: CPT | Mod: 25

## 2023-10-04 NOTE — ED PROVIDER NOTES
Encounter Date: 10/4/2023       History     Chief Complaint   Patient presents with    Fall     Pt fell 10/3 evening; no LOC, A/Ox4   calm and pleasant;  left upper forehead bruise     Patient fell last night and struck her head.  Denies any loss of consciousness, denies any nausea vomiting, denies any other injuries.  Patient says that she feels fine nothing else hurts and then she does not want to be here in the 1st place.  Her family made her come to the ER.    The history is provided by the patient and a relative.     Review of patient's allergies indicates:   Allergen Reactions    Sulfa (sulfonamide antibiotics) Swelling     mouth    Acetaminophen Nausea And Vomiting     Facial swelling      Past Medical History:   Diagnosis Date    Anxiety     Arthritis     Ascending aorta dilatation     Asthma     Atrial fibrillation     Breast cancer     CAD (coronary artery disease)     Clotting disorder     JONES (dyspnea on exertion)     DVT (deep venous thrombosis)     Essential (primary) hypertension     GERD (gastroesophageal reflux disease)     Hypoglycemia     Hypothyroidism, unspecified     Insomnia     Migraines     Mixed hyperlipidemia     Myasthenia gravis without (acute) exacerbation     Pacemaker     PAF (paroxysmal atrial fibrillation)     TIA (transient ischemic attack)      Past Surgical History:   Procedure Laterality Date    APPENDECTOMY      BREAST LUMPECTOMY      CATARACT EXTRACTION      CHOLECYSTECTOMY      CLOSURE OF LEFT ATRIAL APPENDAGE USING DEVICE N/A 4/5/2023    Procedure: Left atrial appendage closure device;  Surgeon: Robert Winston MD;  Location: [facility] CATH LAB;  Service: Cardiology;  Laterality: N/A;  WATCHMAN W/ INTRA OP MALU    COLONOSCOPY      CORONARY STENT PLACEMENT  2018    ECHOCARDIOGRAM,TRANSESOPHAGEAL N/A 4/5/2023    Procedure: Transesophageal echo (MALU) intra-procedure log documentation;  Surgeon: Selina Diagnostic Provider;  Location: [facility] CATH LAB;  Service: Cardiology;   Laterality: N/A;    INSERTION OF PACEMAKER      PARTIAL HYSTERECTOMY       Family History   Problem Relation Age of Onset    Heart disease Father      Social History     Tobacco Use    Smoking status: Never    Smokeless tobacco: Never   Substance Use Topics    Alcohol use: Never    Drug use: Never     Review of Systems   Constitutional:  Negative for fever.   HENT:  Negative for sore throat.    Respiratory:  Negative for shortness of breath.    Cardiovascular:  Negative for chest pain.   Gastrointestinal:  Negative for nausea.   Genitourinary:  Negative for dysuria.   Musculoskeletal:  Negative for back pain.   Skin:  Negative for rash.   Neurological:  Negative for weakness.   Hematological:  Does not bruise/bleed easily.   All other systems reviewed and are negative.      Physical Exam     Initial Vitals [10/04/23 0820]   BP Pulse Resp Temp SpO2   (!) 175/104 71 20 98.2 °F (36.8 °C) 98 %      MAP       --         Physical Exam    Nursing note and vitals reviewed.  Constitutional: She appears well-developed and well-nourished.   HENT:   Left forehead with frontal hematoma   Eyes: EOM are normal. Pupils are equal, round, and reactive to light.   Neck: Neck supple.   Normal range of motion.  Cardiovascular:  Normal rate, regular rhythm and normal heart sounds.           Pulmonary/Chest: Breath sounds normal.   Abdominal: Abdomen is soft. Bowel sounds are normal. There is no abdominal tenderness.   Musculoskeletal:         General: No edema. Normal range of motion.      Cervical back: Normal range of motion and neck supple.      Comments: Bilateral shoulders elbows hands hips knees and feet all appear normal have full range of motion and are nontender. Back appears normal, no spinal tenderness in cervical, thoracic, or lumbar spine.      Neurological: She is alert and oriented to person, place, and time. She has normal strength and normal reflexes. She displays normal reflexes. No cranial nerve deficit or sensory  deficit. GCS score is 15. GCS eye subscore is 4. GCS verbal subscore is 5. GCS motor subscore is 6.   Skin: Skin is warm and dry. Capillary refill takes less than 2 seconds.   Psychiatric: She has a normal mood and affect.         ED Course   Procedures  Labs Reviewed - No data to display       Imaging Results              CT Head Without Contrast (Final result)  Result time 10/04/23 09:23:29      Final result by Chuck Burk MD (10/04/23 09:23:29)                   Impression:      No acute abnormalities are seen      Electronically signed by: Chuck Burk MD  Date:    10/04/2023  Time:    09:23               Narrative:    EXAMINATION:  CT HEAD WITHOUT CONTRAST    CLINICAL HISTORY:  Head trauma, minor (Age >= 65y);    TECHNIQUE:  Low dose axial images were obtained through the head.  Coronal and sagittal reformations were also performed. Contrast was not administered.    Automatic exposure control (AEC) was utilized for dose reduction.    Dose: 1044.4 mGycm    COMPARISON:  09/24/2023    FINDINGS:  There is mild atrophy.  There is decreased attenuation in the periventricular white matter consistent with chronic ischemia.  There is vascular calcification noted.  There is no hemorrhage or extra-axial fluid collections noted.  No masses is seen no acute infarcts are noted.  The calvarium appears intact.                                    X-Rays:   Independently Interpreted Readings:   Head CT: No hemorrhage.  No skull fracture.  No acute stroke.     Medications - No data to display  Medical Decision Making  Amount and/or Complexity of Data Reviewed  Radiology: ordered.                               Clinical Impression:   Final diagnoses:  [S09.90XA] Injury of head, initial encounter (Primary)        ED Disposition Condition    Discharge Stable          ED Prescriptions    None       Follow-up Information       Follow up With Specialties Details Why Contact Perri Mcgregor NP Family Medicine Call  As  needed [address] [facility] [address]  [phone]               Chirag Elizabeth MD  10/04/23 0929

## 2023-11-07 ENCOUNTER — HOSPITAL ENCOUNTER (EMERGENCY)
Facility: HOSPITAL | Age: 88
Discharge: HOME | End: 2023-11-07
Attending: FAMILY MEDICINE
Payer: MEDICARE

## 2023-11-07 VITALS
HEART RATE: 69 BPM | TEMPERATURE: 99 F | BODY MASS INDEX: 17.89 KG/M2 | RESPIRATION RATE: 19 BRPM | HEIGHT: 63 IN | DIASTOLIC BLOOD PRESSURE: 95 MMHG | WEIGHT: 101 LBS | OXYGEN SATURATION: 98 % | SYSTOLIC BLOOD PRESSURE: 178 MMHG

## 2023-11-07 DIAGNOSIS — D64.9 ANEMIA, UNSPECIFIED TYPE: ICD-10-CM

## 2023-11-07 DIAGNOSIS — I16.9 HYPERTENSIVE CRISIS: Primary | ICD-10-CM

## 2023-11-07 DIAGNOSIS — R53.1 WEAKNESS: ICD-10-CM

## 2023-11-07 DIAGNOSIS — R06.02 SOB (SHORTNESS OF BREATH): ICD-10-CM

## 2023-11-07 LAB
ALBUMIN SERPL-MCNC: 3.9 G/DL (ref 3.4–5)
ALBUMIN/GLOB SERPL: 1.3 RATIO
ALP SERPL-CCNC: 66 UNIT/L (ref 50–144)
ALT SERPL-CCNC: 23 UNIT/L (ref 1–45)
ANION GAP SERPL CALC-SCNC: 8 MEQ/L (ref 2–13)
APPEARANCE UR: CLEAR
AST SERPL-CCNC: 41 UNIT/L (ref 14–36)
BASOPHILS # BLD AUTO: 0.01 X10(3)/MCL (ref 0.01–0.08)
BASOPHILS NFR BLD AUTO: 0.2 % (ref 0.1–1.2)
BILIRUB SERPL-MCNC: 0.4 MG/DL (ref 0–1)
BILIRUB UR QL STRIP.AUTO: NEGATIVE
BUN SERPL-MCNC: 29 MG/DL (ref 7–20)
CALCIUM SERPL-MCNC: 8.8 MG/DL (ref 8.4–10.2)
CHLORIDE SERPL-SCNC: 108 MMOL/L (ref 98–110)
CO2 SERPL-SCNC: 24 MMOL/L (ref 21–32)
COLOR UR AUTO: YELLOW
CREAT SERPL-MCNC: 1.06 MG/DL (ref 0.66–1.25)
CREAT/UREA NIT SERPL: 27 (ref 12–20)
EOSINOPHIL # BLD AUTO: 0.03 X10(3)/MCL (ref 0.04–0.36)
EOSINOPHIL NFR BLD AUTO: 0.6 % (ref 0.7–7)
ERYTHROCYTE [DISTWIDTH] IN BLOOD BY AUTOMATED COUNT: 15.5 % (ref 11–14.5)
GFR SERPLBLD CREATININE-BSD FMLA CKD-EPI: 50 MLS/MIN/1.73/M2
GLOBULIN SER-MCNC: 3 GM/DL (ref 2–3.9)
GLUCOSE SERPL-MCNC: 102 MG/DL (ref 70–115)
GLUCOSE UR QL STRIP.AUTO: NEGATIVE
HCT VFR BLD AUTO: 27.1 % (ref 36–48)
HGB BLD-MCNC: 8.9 G/DL (ref 11.8–16)
IMM GRANULOCYTES # BLD AUTO: 0.03 X10(3)/MCL (ref 0–0.03)
IMM GRANULOCYTES NFR BLD AUTO: 0.6 % (ref 0–0.5)
KETONES UR QL STRIP.AUTO: NEGATIVE
LEUKOCYTE ESTERASE UR QL STRIP.AUTO: NEGATIVE
LYMPHOCYTES # BLD AUTO: 0.65 X10(3)/MCL (ref 1.16–3.74)
LYMPHOCYTES NFR BLD AUTO: 13.7 % (ref 20–55)
MCH RBC QN AUTO: 30.4 PG (ref 27–34)
MCHC RBC AUTO-ENTMCNC: 32.8 G/DL (ref 31–37)
MCV RBC AUTO: 92.5 FL (ref 79–99)
MONOCYTES # BLD AUTO: 0.55 X10(3)/MCL (ref 0.24–0.36)
MONOCYTES NFR BLD AUTO: 11.6 % (ref 4.7–12.5)
NEUTROPHILS # BLD AUTO: 3.48 X10(3)/MCL (ref 1.56–6.13)
NEUTROPHILS NFR BLD AUTO: 73.3 % (ref 37–73)
NITRITE UR QL STRIP.AUTO: NEGATIVE
NRBC BLD AUTO-RTO: 0 %
PH UR STRIP.AUTO: 6.5 [PH]
PLATELET # BLD AUTO: 256 X10(3)/MCL (ref 140–371)
PMV BLD AUTO: 10.3 FL (ref 9.4–12.4)
POTASSIUM SERPL-SCNC: 4.4 MMOL/L (ref 3.5–5.1)
PROT SERPL-MCNC: 6.9 GM/DL (ref 6.3–8.2)
PROT UR QL STRIP.AUTO: NEGATIVE
RBC # BLD AUTO: 2.93 X10(6)/MCL (ref 4–5.1)
RBC UR QL AUTO: NEGATIVE
SODIUM SERPL-SCNC: 140 MMOL/L (ref 135–145)
SP GR UR STRIP.AUTO: 1.02 (ref 1–1.03)
TROPONIN I SERPL-MCNC: <0.012 NG/ML (ref 0–0.03)
UROBILINOGEN UR STRIP-ACNC: 0.2
WBC # SPEC AUTO: 4.75 X10(3)/MCL (ref 4–11.5)

## 2023-11-07 PROCEDURE — 81003 URINALYSIS AUTO W/O SCOPE: CPT | Performed by: NURSE PRACTITIONER

## 2023-11-07 PROCEDURE — 80053 COMPREHEN METABOLIC PANEL: CPT | Performed by: NURSE PRACTITIONER

## 2023-11-07 PROCEDURE — 93005 ELECTROCARDIOGRAM TRACING: CPT

## 2023-11-07 PROCEDURE — 93010 EKG 12-LEAD: ICD-10-PCS | Mod: ,,, | Performed by: INTERNAL MEDICINE

## 2023-11-07 PROCEDURE — 85025 COMPLETE CBC W/AUTO DIFF WBC: CPT | Performed by: NURSE PRACTITIONER

## 2023-11-07 PROCEDURE — 93010 ELECTROCARDIOGRAM REPORT: CPT | Mod: ,,, | Performed by: INTERNAL MEDICINE

## 2023-11-07 PROCEDURE — 84484 ASSAY OF TROPONIN QUANT: CPT | Performed by: FAMILY MEDICINE

## 2023-11-07 PROCEDURE — 99285 EMERGENCY DEPT VISIT HI MDM: CPT | Mod: 25

## 2023-11-07 NOTE — ED PROVIDER NOTES
Encounter Date: 11/7/2023       History     Chief Complaint   Patient presents with    abnormal labs     Generalized weakness with SOB, denies Dark tarry stool. Reports recent abnormal labs.      Patient presents for evaluation of HTN and falls. Patient notes having a history of recurrent falls for the past several months.Patient is without complaint at present.     The history is provided by the patient.     Review of patient's allergies indicates:   Allergen Reactions    Sulfa (sulfonamide antibiotics) Swelling     mouth    Acetaminophen Nausea And Vomiting     Facial swelling      Past Medical History:   Diagnosis Date    Anxiety     Arthritis     Ascending aorta dilatation     Asthma     Atrial fibrillation     Breast cancer     CAD (coronary artery disease)     Clotting disorder     JONES (dyspnea on exertion)     DVT (deep venous thrombosis)     Essential (primary) hypertension     GERD (gastroesophageal reflux disease)     Hypoglycemia     Hypothyroidism, unspecified     Insomnia     Migraines     Mixed hyperlipidemia     Myasthenia gravis without (acute) exacerbation     Pacemaker     PAF (paroxysmal atrial fibrillation)     TIA (transient ischemic attack)      Past Surgical History:   Procedure Laterality Date    APPENDECTOMY      BREAST LUMPECTOMY      CATARACT EXTRACTION      CHOLECYSTECTOMY      CLOSURE OF LEFT ATRIAL APPENDAGE USING DEVICE N/A 4/5/2023    Procedure: Left atrial appendage closure device;  Surgeon: Robert Winston MD;  Location: [facility] CATH LAB;  Service: Cardiology;  Laterality: N/A;  WATCHMAN W/ INTRA OP MALU    COLONOSCOPY      CORONARY STENT PLACEMENT  2018    ECHOCARDIOGRAM,TRANSESOPHAGEAL N/A 4/5/2023    Procedure: Transesophageal echo (MALU) intra-procedure log documentation;  [facility];  Location: [facility] CATH [facility];  Service: Cardiology;  Laterality: N/A;    INSERTION OF PACEMAKER      PARTIAL HYSTERECTOMY       Family History   Problem Relation Age of  Onset    Heart disease Father      Social History     Tobacco Use    Smoking status: Never    Smokeless tobacco: Never   Substance Use Topics    Alcohol use: Never    Drug use: Never     Review of Systems   Constitutional: Negative.    HENT: Negative.     Eyes: Negative.    Respiratory: Negative.     Cardiovascular: Negative.    Gastrointestinal: Negative.    Endocrine: Negative.    Genitourinary: Negative.    Musculoskeletal: Negative.    Skin: Negative.    Allergic/Immunologic: Negative.    Neurological: Negative.    Hematological: Negative.    Psychiatric/Behavioral: Negative.         Physical Exam     Initial Vitals [11/07/23 1227]   BP Pulse Resp Temp SpO2   (!) 152/84 69 19 98.6 °F (37 °C) 99 %      MAP       --         Physical Exam    Vitals reviewed.  Constitutional: She appears well-developed and well-nourished.   HENT:   Head: Normocephalic and atraumatic.   Eyes: EOM are normal. Pupils are equal, round, and reactive to light.   Neck: Neck supple.   Normal range of motion.  Cardiovascular:  Normal rate and regular rhythm.           Pulmonary/Chest: Breath sounds normal.   Abdominal: Abdomen is soft. Bowel sounds are normal.   Musculoskeletal:         General: Normal range of motion.      Cervical back: Normal range of motion and neck supple.     Neurological: She is alert and oriented to person, place, and time. She has normal reflexes. GCS score is 15. GCS eye subscore is 4. GCS verbal subscore is 5. GCS motor subscore is 6.   Skin: Skin is warm.   Psychiatric: She has a normal mood and affect.         ED Course   Procedures  Labs Reviewed   COMPREHENSIVE METABOLIC PANEL - Abnormal; Notable for the following components:       Result Value    Blood Urea Nitrogen 29.0 (*)     Aspartate Aminotransferase 41 (*)     BUN/Creatinine Ratio 27 (*)     All other components within normal limits   CBC WITH DIFFERENTIAL - Abnormal; Notable for the following components:    RBC 2.93 (*)     Hgb 8.9 (*)     Hct 27.1 (*)      RDW 15.5 (*)     Neut % 73.3 (*)     Lymph % 13.7 (*)     Eos % 0.6 (*)     Lymph # 0.65 (*)     Mono # 0.55 (*)     Eos # 0.03 (*)     IG% 0.6 (*)     All other components within normal limits   URINALYSIS - Normal    Narrative:      URINE STABILITY IS 2 HOURS AT ROOM TEMP OR    SIX HOURS REFRIGERATED. PERFORMING TESTING ON    SPECIMENS GREATER THAN THIS AGE MAY AFFECT THE    FOLLOWING TESTS:    PH          SPECIFIC GRAVITY           BLOOD    CLARITY     BILIRUBIN               UROBILINOGEN   TROPONIN I - Normal   CBC W/ AUTO DIFFERENTIAL    Narrative:     The following orders were created for panel order CBC auto differential.  Procedure                               Abnormality         Status                     ---------                               -----------         ------                     CBC with Differential[[id number]]        Abnormal            Final result                 Please view results for these tests on the individual orders.   OCCULT BLOOD, STOOL 1ST SPECIMEN     EKG Readings: (Independently Interpreted)   Initial Reading: No STEMI.   AV Dual Paced Rhythm       Imaging Results              CT Head Without Contrast (Final result)  Result time 11/07/23 15:48:26      Final result by Aj Conrad MD (11/07/23 15:48:26)                   Impression:        1. Chronic findings as described above and previously seen.  No acute intracranial abnormality.      Electronically signed by: Aj Conrad  Date:    11/07/2023  Time:    15:48               Narrative:    EXAMINATION:  CT HEAD WITHOUT CONTRAST    CLINICAL HISTORY:  Mental status change, unknown cause;    TECHNIQUE:  Low dose axial images were obtained through the head.  Coronal and sagittal reformations were also performed. Contrast was not administered.    COMPARISON:  10/04/2023    FINDINGS:  Axial imaging through the head/brain was performed.Moderate generalized atrophy is present along with moderate areas of decreased attenuation  involving the periventricular deep white matter on both sides compatible with moderate chronic ischemic disease.  I see no intraparynchymal masses, hemorhagic lesions, or dominant wedge shaped infarcts. I see no extraxial masses or abnormal fluid collections.  Vascular calcification is noted.                                       X-Ray Chest AP Portable (Final result)  Result time 11/07/23 14:00:26      Final result by Larisa Jackson III, MD (11/07/23 14:00:26)                   Impression:      1. Chronic changes are present as described above.  See above comments.  2. I see no lobar or segmental infiltrates or other significant abnormalities.      Electronically signed by: Larisa Jackson  Date:    11/07/2023  Time:    14:00               Narrative:    EXAMINATION:  STUDY: XR CHEST AP PORTABLE    CLINICAL HISTORY:  Victoriano Hoff, RT on 11/7/2023  1:39 PMX 1-2 DAYS  -WEAKNESS, SOB  -DARK STOOLS    COMPARISON:  None    FINDINGS:  A multilead pacemaker overlies left hemithorax with leads appearing well position.The heart is moderately enlarged with no significant vascular congestion or aarti decompensation.  The lungs are slightly under expanded.I see no lobar or segmental infiltrates.No significant pleural effusions are noted.There is mild demineralization of the skeletal structures with mild to moderate degenerative changes noted throughout the thoracic spine.  Atherosclerotic calcifications are noted within the thoracic aorta.  Surgical clips are noted within the left axilla.                                       Medications - No data to display  Medical Decision Making  Amount and/or Complexity of Data Reviewed  Labs: ordered. Decision-making details documented in ED Course.  Radiology: ordered. Decision-making details documented in ED Course.  ECG/medicine tests:  Decision-making details documented in ED Course.               ED Course as of 11/07/23 1615   Tue Nov 07, 2023   1413 X-Ray Chest AP Portable [KL]   1414  EKG 12-lead [KL]   1416 RBC(!): 2.93 [KL]      ED Course User Index  [KL] Rayna Bajwa FNP                    Clinical Impression:   Final diagnoses:  [R06.02] SOB (shortness of breath)  [R53.1] Weakness  [I16.9] Hypertensive crisis (Primary)  [D64.9] Anemia, unspecified type        ED Disposition Condition    Discharge Stable          ED Prescriptions    None       Follow-up Information    None          Teo Alejandro MD  11/07/23 1615       Teo Alejandro MD  11/07/23 1616

## 2023-11-30 ENCOUNTER — HOSPITAL ENCOUNTER (EMERGENCY)
Facility: HOSPITAL | Age: 88
Discharge: HOME | End: 2023-11-30
Attending: EMERGENCY MEDICINE
Payer: MEDICARE

## 2023-11-30 VITALS
SYSTOLIC BLOOD PRESSURE: 160 MMHG | DIASTOLIC BLOOD PRESSURE: 96 MMHG | OXYGEN SATURATION: 96 % | HEIGHT: 60 IN | BODY MASS INDEX: 19.83 KG/M2 | WEIGHT: 101 LBS | HEART RATE: 72 BPM | RESPIRATION RATE: 18 BRPM | TEMPERATURE: 98 F
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
## 2023-11-30 DIAGNOSIS — S50.02XA CONTUSION OF LEFT ELBOW, INITIAL ENCOUNTER: Primary | ICD-10-CM

## 2023-11-30 PROCEDURE — 99284 EMERGENCY DEPT VISIT MOD MDM: CPT | Mod: 25

## 2023-11-30 NOTE — ED PROVIDER NOTES
Encounter Date: 2023       History     Chief Complaint   Patient presents with    Fall     Pt states she slipped and fell getting out of bed and laid on the floor about one hour. Denies LOC pt on plavix,. Pt has hematoma to forehead and left arm pain.     Mr. Murphy presents to emergency department after a fall either last Thursday the  or Friday the .  She is on Plavix left arm is still hurting so she came to the emergency department she did hit her left forehead.  Her long term friend brings her to the emergency department today she is complaining of left arm pain.  Long term friend says she is acting perfectly normal speech is good her gait is good she has no complaints except left arm pain.  She is awake she is oriented.  She knows what day the the holiday was on last week she knows the name of the holiday she knows where she is at.  She knows the day of the week today.  GCS is 15.  Patient takes Plavix because she has coronary artery disease she has a pacemaker in place.  She does have a history of hypertension.    She is  and lives alone.    She does not use tobacco alcohol or drugs.    She is had COVID shots RSV shots pneumonia shots flu shots no tetanus in the last 5 years.  She is had cholecystectomy hysterectomy pacemaker placement 1 stent in her heart.  She is had stomach resection for peptic ulcer disease  .  She is had her appendix removed she sees a nurse practitioner in [facility].   1 para 1.  She is retired.     Family history mother  of a abdominal aortic aneurism.  Dad was a bad alcoholic and passed away  Allergies include sulfur and acetaminophen.          Review of patient's allergies indicates:   Allergen Reactions    Sulfa (sulfonamide antibiotics) Swelling     mouth    Acetaminophen Nausea And Vomiting     Facial swelling      Past Medical History:   Diagnosis Date    Anxiety     Arthritis     Ascending aorta dilatation     Asthma     Atrial fibrillation      Breast cancer     CAD (coronary artery disease)     Clotting disorder     JONES (dyspnea on exertion)     DVT (deep venous thrombosis)     Essential (primary) hypertension     GERD (gastroesophageal reflux disease)     Hypoglycemia     Hypothyroidism, unspecified     Insomnia     Migraines     Mixed hyperlipidemia     Myasthenia gravis without (acute) exacerbation     Pacemaker     PAF (paroxysmal atrial fibrillation)     TIA (transient ischemic attack)      Past Surgical History:   Procedure Laterality Date    APPENDECTOMY      BREAST LUMPECTOMY      CATARACT EXTRACTION      CHOLECYSTECTOMY      CLOSURE OF LEFT ATRIAL APPENDAGE USING DEVICE N/A 4/5/2023    Procedure: Left atrial appendage closure device;  Surgeon: Robert Winston MD;  Location: [facility] CATH LAB;  Service: Cardiology;  Laterality: N/A;  WATCHMAN W/ INTRA OP MALU    COLONOSCOPY      CORONARY STENT PLACEMENT  2018    ECHOCARDIOGRAM,TRANSESOPHAGEAL N/A 4/5/2023    Procedure: Transesophageal echo (MALU) intra-procedure log documentation;  Surgeon: Selina Diagnostic Provider;  Location: [facility] CATH LAB;  Service: Cardiology;  Laterality: N/A;    INSERTION OF PACEMAKER      PARTIAL HYSTERECTOMY       Family History   Problem Relation Age of Onset    Heart disease Father      Social History     Tobacco Use    Smoking status: Never    Smokeless tobacco: Never   Substance Use Topics    Alcohol use: Never    Drug use: Never     Review of Systems   Constitutional: Negative.  Negative for activity change, chills, fever and unexpected weight change.   HENT:  Positive for dental problem. Negative for ear pain, facial swelling (There was some swelling of her left forehead after falling last week but it is gone down now), nosebleeds, postnasal drip, sore throat, trouble swallowing and voice change.    Eyes: Negative.    Respiratory: Negative.     Cardiovascular: Negative.    Gastrointestinal:  Positive for abdominal pain. Negative for nausea and vomiting.    Endocrine: Negative.    Genitourinary:  Negative for difficulty urinating, hematuria and pelvic pain.   Musculoskeletal:  Positive for back pain and joint swelling (Left elbow left shoulder seemed to be the worst of it.  Distal leftHumerus.  She has aches and pains all over but not related to the fall).   Skin: Negative.    Allergic/Immunologic: Negative.    Neurological: Negative.    Hematological: Negative.    Psychiatric/Behavioral: Negative.     All other systems reviewed and are negative.      Physical Exam     Initial Vitals   BP Pulse Resp Temp SpO2   11/30/23 1023 11/30/23 1023 11/30/23 1023 11/30/23 1023 11/30/23 1034   (!) 150/85 68 20 97.9 °F (36.6 °C) 100 %      MAP       --                Physical Exam    Nursing note and vitals reviewed.  Constitutional: She appears well-developed and well-nourished.   This is a very slender 89-year-old female speech is clear goal oriented GCS is 15 she is delightful to talk to.  Friend at the bedside said she is in her normal mental status   HENT:   Head: Normocephalic and atraumatic.   Right Ear: Tympanic membrane and external ear normal.   Left Ear: Tympanic membrane and external ear normal.   Nose: Nose normal.   Mouth/Throat: Oropharynx is clear and moist and mucous membranes are normal.   No hemotympanum on either side.  No nuchal rigidity no meningeal signs flexes and extends neck very well I do not see any obvious contusion or ecchymosis of her left forearm.  There is no signs of periorbital hematomas   Eyes: EOM are normal. Pupils are equal, round, and reactive to light.   Neck: Neck supple. No thyromegaly present. No tracheal deviation present. No JVD present.   Normal range of motion.  Cardiovascular:  Normal rate, regular rhythm and normal heart sounds.     Exam reveals no gallop and no friction rub.       No murmur heard.  Pulmonary/Chest: Breath sounds normal. No stridor. No respiratory distress. She has no wheezes. She has no rhonchi. She has no rales.  She exhibits no tenderness.   Abdominal: Abdomen is soft. Bowel sounds are normal. She exhibits no distension and no mass. There is no abdominal tenderness.   Genitourinary:    Genitourinary Comments: There is no CVA tenderness     Musculoskeletal:         General: Tenderness present. No edema. Normal range of motion.      Cervical back: Normal range of motion and neck supple.      Comments: Patient has tenderness in the humerus from the shoulder down to the elbow there is no obvious deformity.  She is tender on both medial and lateral epicondyle and olecranon.  But she can pronate supinate flex and extend elbow without any significantDiscomfort distally the forearm is benign the wrist and fingers are benign intact sensation median radial ulnar nerve were intact.  Shoulder has a normal contour and appears to be benign exam.  Clavicle is nontender.  There is no palpable spine direct tenderness.  There is no step-off in the back     Lymphadenopathy:     She has no cervical adenopathy.   Neurological: She is alert and oriented to person, place, and time. She has normal strength and normal reflexes. No cranial nerve deficit. GCS score is 15. GCS eye subscore is 4. GCS verbal subscore is 5. GCS motor subscore is 6.   Skin: Skin is warm and dry. Capillary refill takes less than 2 seconds. No rash noted.   Range of motion great  Ecchymosis mostly around that distal humerus and elbow area.  Tenderness on the bony points.   Psychiatric: She has a normal mood and affect. Thought content normal.         ED Course   Procedures  Labs Reviewed - No data to display       Imaging Results              CT Head Without Contrast (Final result)  Result time 11/30/23 11:30:15      Final result by Augusto Rosenthal MD (11/30/23 11:30:15)                   Impression:      1. No acute intracranial abnormality identified  2. Generalized cerebral and cerebellar atrophy  3. Intracranial atherosclerosis  4. Mild scattered hypodensities at the  periventricular matter suspicious for small vessel ischemic changes      Electronically signed by: Augusto Rosenthal  Date:    11/30/2023  Time:    11:30               Narrative:    EXAMINATION:  CT HEAD WITHOUT CONTRAST    CLINICAL HISTORY:  FALL on plavix;, .    TECHNIQUE:  PATIENT RADIATION DOSE: DLP(mGycm) 1280    As per PQRS measures, all CT scans at this facility used dose modulation, iterative reconstruction, and/or weight based dose adjustment when appropriate to reduce radiation dose to as low as reasonably achievable.    COMPARISON:  11/07/2023    FINDINGS:  Serial axial images were obtained of the head without the administration of IV contrast. Both brain and bone parenchymal windows were obtained.  Additional coronal and sagittal reconstructions were obtained.  Ventricles, cisterns, and sulci are prominent size.  There is no evidence of intracranial hemorrhage, midline shift, mass effect, or abnormal extra-axial fluid collections.  Scattered hypodensities are seen within the periventricular white matter.  Intracranial atherosclerosis is identified.  Cerebellar tonsils extend caudally to the level of foramen magnum.  Visualized paranasal sinuses and mastoid air cells are relatively clear.  There is underdevelopment of the frontal sinuses.  External auditory canals are grossly patent.  Bony structures are mildly osteopenic.  No acute calvarial fracture is seen.                                       CT Cervical Spine Without Contrast (Final result)  Result time 11/30/23 11:52:30      Final result by Augusto Rosenthal MD (11/30/23 11:52:30)                   Impression:      1. No acute osseous defect identified  2. Cervical spondylosis  3. Dextroconvexity of the cervical spine  4. Multilevel mild-to-moderate encroachment into the central spinal canal and neural foramina as described  5. Atherosclerosis  6. Ill-defined subpleural patchy nodular foci are evident at the posterior right upper lung measuring up to  1.5 cm      Electronically signed by: uAgusto Rosenthal  Date:    11/30/2023  Time:    11:52               Narrative:    EXAMINATION:  CT CERVICAL SPINE WITHOUT CONTRAST    CLINICAL HISTORY:  , FALL on plavix;    TECHNIQUE,:  PATIENT RADIATION DOSE: DLP(mGycm) 1280    As per PQRS measures, all CT scans at this facility used dose modulation, iterative reconstruction, and/or weight based dose adjustment when appropriate to reduce radiation dose to as low as reasonably achievable.    COMPARISON:  09/24/2023    FINDINGS:  Serial axial images were obtained of the cervical spine with the administration of intravenous contrast.  Additional coronal and sagittal images were performed.  Both soft tissue and bone windows were obtained. Vertebral body height and alignment are grossly intact.  No fracture or subluxation is seen.  Disc space narrowing, osteophyte formation, and facet arthrosis are identified.  There is no loss of integrity to the bony spinal canal.  There is curvature of the cervical spine with the convexity to the right.  Bony structures are osteopenic.  There is again multilevel mild to moderate encroachment into the central spinal canal and neural foramina secondary to posterior osteophyte formation, facet arthrosis, and poorly visualized posterior disc bulge which has a similar appearance to the recent exam.  Atherosclerosis is seen within the aorta and carotid arteries.  A few scattered punctate calcifications are again noted to the posterior neck.  Ill-defined subpleural nodular foci are evident at the posterior right lower lung measuring up to 1.5 cm in diameter                                       X-Ray Shoulder Trauma Left (Final result)  Result time 11/30/23 11:59:24      Final result by Augusto Rosenthal MD (11/30/23 11:59:24)                   Impression:      1. No acute osseous defect identified  2. Widening of the acromioclavicular joint without elevation of the clavicle relative to the acromion  3.  Osteopenia  4. Faint punctate calcifications subacromial space suspicious for bursal or tendinous calcification  5. Postsurgical changes left axilla  6. Cardiac device left upper chest      Electronically signed by: Augusto Rosenthal  Date:    11/30/2023  Time:    11:59               Narrative:    EXAMINATION:  XR SHOULDER TRAUMA 3 VIEW LEFT    CLINICAL HISTORY:  Unspecified fall, initial encounter; .    COMPARISON:  None available.    FINDINGS:  Internal rotation, external rotation, and transscapular Y-views revealno definite fracture or dislocation.  There is widening of the acromioclavicular joint although no significant elevation is noted to the clavicle relative to the acromion.  No aggressive osteolytic or osteoblastic lesion is seen. Bony structures are osteopenic.  Surgical clips are seen within the left axilla.  A cardiac device is noted to the left upper chest.  A few faint punctate calcifications are seen the subacromial space.                        Wet Read by Augusto Carrion MD (11/30/23 11:41:11, [facility] - Emergency Dept, Emergency Medicine)    No acute injury                                     X-Ray Elbow Complete Left (Final result)  Result time 11/30/23 12:01:12      Final result by Augusto Rosenthal MD (11/30/23 12:01:12)                   Impression:      1. No acute osseous defect identified  2. Osteopenia      Electronically signed by: Augusto Rosenthal  Date:    11/30/2023  Time:    12:01               Narrative:    EXAMINATION:  XR ELBOW COMPLETE 3 VIEW LEFT    CLINICAL HISTORY:  Unspecified fall, initial encounter; .    COMPARISON:  None available.    FINDINGS:  AP, lateral, and oblique views reveal no definite fracture or dislocation.  Joint spaces appear grossly intact.The lateral view is limited secondary to suboptimal positioning.  No obvious elevation of the posterior fat pad is seen.  Bony structures are osteopenic.  Joint spaces appear grossly intact.                         Wet Read by Augusto Carrion MD (11/30/23 11:41:04, [facility] - Emergency Dept, Emergency Medicine)    No acute injury                                     X-Ray Humerus 2 View Left (Final result)  Result time 11/30/23 12:03:18      Final result by Augusto Rosenthal MD (11/30/23 12:03:18)                   Impression:      1. No definite acute osseous defect identified  2. Widened acromioclavicular joint which has a similar appearance to the prior exam without elevation of the distal clavicle relative to the acromion  3. Osteopenia  4. Postsurgical changes left lateral chest/axilla      Electronically signed by: Augusto Rosenthal  Date:    11/30/2023  Time:    12:03               Narrative:    EXAMINATION:  XR HUMERUS 2 VIEW LEFT    CLINICAL HISTORY:  Unspecified fall, initial encounter; .    COMPARISON:  01/16/2023    FINDINGS:  AP and lateral views reveal no definite fracture or dislocation.  Joint spaces appear grossly intact.  No aggressive osteolytic or osteoblastic lesions seen.Bony structures are osteopenic.  Surgical clips are evident at the left axilla/lateral chest.  There is widening of the acromioclavicular joint without elevation of the distal clavicle relative to the acromion                        Wet Read by Augusto Carrion MD (11/30/23 11:41:17, [facility] - Emergency Dept, Emergency Medicine)    No acute injury                                     Medications   Tdap (BOOSTRIX) vaccine injection 0.5 mL (has no administration in time range)     Medical Decision Making    Mr. Murphy presents to emergency department after a fall either last Thursday the 24th or Friday the 25th.  She is on Plavix left arm is still hurting so she came to the emergency department she did hit her left forehead.  Her long term friend brings her to the emergency department today she is complaining of left arm pain.  Long term friend says she is acting perfectly normal speech is good her gait is good she  has no complaints except left arm pain.  She is awake she is oriented.  She knows what day the the holiday was on last week she knows the name of the holiday she knows where she is at.  She knows the day of the week today.  GCS is 15.  Patient takes Plavix because she has coronary artery disease she has a pacemaker in place.  She does have a history of hypertension.    She is  and lives alone.      Amount and/or Complexity of Data Reviewed  Independent Historian: friend     Details: Friend correlates with the patient tells us  External Data Reviewed: notes.     Details: ED records from recent admissions here and in [place] reviewed  Radiology: ordered and independent interpretation performed.     Details: Final x-ray interpretation will be done within the next 12-24 hours.  Left humerus is ordered left elbow is ordered left shoulder is ordered.  Brain CT without contrast neck CT without contrast  Discussion of management or test interpretation with external provider(s): Differential diagnosis include cerebral contusion intracranial hemorrhage skull fracture cervical spine injury.  Higher risk for bleeding due to the Plavix use.  Fracture shoulder dislocation of shoulder internal derangement of shoulder fracture of humerus closed nondisplaced fracture of humerus closed displaced.  Elbow injury soft-tissue versus fracture.  Left side    Risk  Prescription drug management.  Risk Details: MDM  Problems addressed  Co-morbidities and/or factors adding to the complexity or risk for the patient:  Age and  on Plavix does live alone  Problems addressed:  Fall a proximally a week ago with persistent left arm pain  Acute problem/illness or progression/exacerbation of chronic problem with potential threat to life/bodily dysfunction?:  None known  Differential diagnoses/problems considered: see above     Amount and/or Complexity of Data Reviewed  Independent Historian: friend  (see above for summary)  External Data  Reviewed: notes from previous ED visits (see above for summary)  Risk and benefits of testing: discussed   Labs: Labs: ordered and reviewed  Radiology:Radiology: ordered and independent interpretation performed (see above or ED course)  ECG/medicine tests:Radiology: ordered and independent interpretation performed (see above or ED course)  none    Risk  Diagnosis or treatment significantly impacted by social determinants of health: transport limitation  Shared decision making     Critical Care  none     Critical Care  Total time providing critical care: 0 minutes               ED Course as of 11/30/23 1208   Thu Nov 30, 2023   1139 Brain CT returned normal without any acute abnormalities [DM]   1204 All the films have returned negative I talked to her friend and the patient plainly I told her she will be sore she will be discharged home she can take whenever she normally takes for pain I am not prescribing the 89-year-old lady anything I do not think that would be in her best interest she is prepared for release [DM]      ED Course User Index  [DM] Augusto Carrion MD                           Clinical Impression:  Final diagnoses:  [W19.XXXA] Fall  [S50.02XA] Contusion of left elbow, initial encounter (Primary)  [W19.XXXA, Y92.009] Fall in home, initial encounter          ED Disposition Condition    Discharge Stable          ED Prescriptions    None       Follow-up Information       Follow up With Specialties Details Why Contact Info    Perri Nix NP Family Medicine In 1 week  [address] [facility] [phone]               Augusto Carrion MD  11/30/23 0298

## 2023-11-30 NOTE — DISCHARGE INSTRUCTIONS
You will be sore you can take what ever you normally take for your discomfort this should improve over the next 1-2 weeks    Please continue all your home medications    Follow-up with your primary healthcare provider next week for re-evaluation please

## 2023-12-18 ENCOUNTER — HOSPITAL ENCOUNTER (EMERGENCY)
Facility: HOSPITAL | Age: 88
Discharge: HOME | End: 2023-12-18
Attending: FAMILY MEDICINE
Payer: MEDICARE

## 2023-12-18 VITALS — HEIGHT: 60 IN | BODY MASS INDEX: 19.83 KG/M2 | WEIGHT: 101 LBS

## 2023-12-18 DIAGNOSIS — S60.222A CONTUSION OF LEFT HAND, INITIAL ENCOUNTER: ICD-10-CM

## 2023-12-18 DIAGNOSIS — S09.90XA INJURY OF HEAD, INITIAL ENCOUNTER: Primary | ICD-10-CM

## 2023-12-18 PROCEDURE — 99284 EMERGENCY DEPT VISIT MOD MDM: CPT | Mod: 25

## 2023-12-19 NOTE — ED PROVIDER NOTES
Encounter Date: 12/18/2023       History     Chief Complaint   Patient presents with    Fall     Pt. Reports mechanical fall yesterday, denies LOC. Reports. L side Headache. Denies     Head Injury     Aaox4 denies AMS    Hand Injury     Patient brought to the emergency room at [facility] after a fall yesterday.  She fell onto her left side of her head into her left hand.  No loss of consciousness, no nausea or vomiting.  She does take Plavix.    The history is provided by the patient and a relative.     Review of patient's allergies indicates:   Allergen Reactions    Sulfa (sulfonamide antibiotics) Swelling     mouth    Acetaminophen Nausea And Vomiting     Facial swelling      Past Medical History:   Diagnosis Date    Anxiety     Arthritis     Ascending aorta dilatation     Asthma     Atrial fibrillation     Breast cancer     CAD (coronary artery disease)     Clotting disorder     JONES (dyspnea on exertion)     DVT (deep venous thrombosis)     Essential (primary) hypertension     GERD (gastroesophageal reflux disease)     Hypoglycemia     Hypothyroidism, unspecified     Insomnia     Migraines     Mixed hyperlipidemia     Myasthenia gravis without (acute) exacerbation     Pacemaker     PAF (paroxysmal atrial fibrillation)     TIA (transient ischemic attack)      Past Surgical History:   Procedure Laterality Date    APPENDECTOMY      BREAST LUMPECTOMY      CATARACT EXTRACTION      CHOLECYSTECTOMY      CLOSURE OF LEFT ATRIAL APPENDAGE USING DEVICE N/A 4/5/2023    Procedure: Left atrial appendage closure device;  Surgeon: Robert Winston MD;  Location: [facility] CATH LAB;  Service: Cardiology;  Laterality: N/A;  WATCHMAN W/ INTRA OP MALU    COLONOSCOPY      CORONARY STENT PLACEMENT  2018    ECHOCARDIOGRAM,TRANSESOPHAGEAL N/A 4/5/2023    Procedure: Transesophageal echo (MALU) intra-procedure log documentation;  Surgeon: Selina Diagnostic Provider;  Location: [facility] CATH LAB;  Service: Cardiology;  Laterality:  N/A;    INSERTION OF PACEMAKER      PARTIAL HYSTERECTOMY       Family History   Problem Relation Age of Onset    Heart disease Father      Social History     Tobacco Use    Smoking status: Never    Smokeless tobacco: Never   Substance Use Topics    Alcohol use: Never    Drug use: Never     Review of Systems   Constitutional:  Negative for fever.   HENT:  Negative for sore throat.    Respiratory:  Negative for shortness of breath.    Cardiovascular:  Negative for chest pain.   Gastrointestinal:  Negative for nausea.   Genitourinary:  Negative for dysuria.   Musculoskeletal:  Negative for back pain.   Skin:  Negative for rash.   Neurological:  Negative for weakness.   Hematological:  Does not bruise/bleed easily.   All other systems reviewed and are negative.      Physical Exam     Initial Vitals   BP Pulse Resp Temp SpO2   -- -- -- -- --      MAP       --         Physical Exam    Nursing note and vitals reviewed.  Constitutional: She appears well-developed and well-nourished.   HENT:   Mild tenderness to left temporal area.  No bruising or swelling noted.   Eyes: EOM are normal. Pupils are equal, round, and reactive to light.   Neck: Neck supple.   Normal range of motion.  Cardiovascular:  Normal rate, regular rhythm and normal heart sounds.           Pulmonary/Chest: Breath sounds normal.   Abdominal: Abdomen is soft. Bowel sounds are normal. There is no abdominal tenderness.   Musculoskeletal:         General: Normal range of motion.      Cervical back: Normal range of motion and neck supple.      Comments: Left hand base of palm proximal to thumb tender with some ecchymosis noted.  Distal capillary refill less than 3 seconds sensation is intact     Neurological: She is alert and oriented to person, place, and time.   Skin: Skin is warm and dry. Capillary refill takes less than 2 seconds.   Psychiatric: She has a normal mood and affect.         ED Course   Procedures  Labs Reviewed - No data to display        Imaging Results              X-Ray Hand 3 view Left (In process)                      CT Head Without Contrast (Final result)  Result time 12/18/23 17:19:50      Final result by Aj Conrad MD (12/18/23 17:19:50)                   Impression:        1. Chronic findings as described above and previously seen.  No acute intracranial abnormality.    n/a    Category: n/a    The following dose reduction techniques are used for all CT at [facility]:    1.   Automated exposure control.    2.   Adjustment of the mA and/or kV according to patient size.    3.   Use of iterative reconstruction technique.      Electronically signed by: Aj Conrad  Date:    12/18/2023  Time:    17:19               Narrative:    EXAMINATION:  CT HEAD WITHOUT CONTRAST    CLINICAL HISTORY:  Head trauma, intracranial arterial injury suspected;    TECHNIQUE:  Low dose axial images were obtained through the head.  Coronal and sagittal reformations were also performed. Contrast was not administered.    COMPARISON:  11/30/2023    FINDINGS:  Axial imaging through the head/brain was performed.Central and cortical atrophy is present along with areas of decreased attenuation involving the periventricular deep white matter on both sides compatible with moderate chronic ischemic disease.  Atherosclerotic calcification is also present.  I see no intraparynchymal masses, hemorhagic lesions, or dominant wedge shaped infarcts. I see no extraxial masses or abnormal fluid collections.                                    X-Rays:   Independently Interpreted Readings:   Other Readings:  CT of the brain no acute  X-ray left hand no fractures    Medications - No data to display  Medical Decision Making                                    Clinical Impression:  Final diagnoses:  [S09.90XA] Injury of head, initial encounter (Primary)  [S60.222A] Contusion of left hand, initial encounter          ED Disposition Condition    Discharge Stable           ED Prescriptions    None       Follow-up Information       Follow up With Specialties Details Why Contact Info    Perri Nix NP Family Medicine Call  As needed [address] [facility] [address] [phone]               Chirag Elizabeth MD  12/18/23 8132

## 2024-01-01 ENCOUNTER — HOSPITAL ENCOUNTER (EMERGENCY)
Facility: HOSPITAL | Age: 89
Discharge: PSYCHIATRIC OR SUBSTANCE USE FACILITY | End: 2024-01-01
Attending: FAMILY MEDICINE
Payer: MEDICARE

## 2024-01-01 VITALS
SYSTOLIC BLOOD PRESSURE: 142 MMHG | HEART RATE: 80 BPM | WEIGHT: 107.13 LBS | TEMPERATURE: 98 F | BODY MASS INDEX: 20.93 KG/M2 | RESPIRATION RATE: 20 BRPM | DIASTOLIC BLOOD PRESSURE: 89 MMHG | OXYGEN SATURATION: 99 %

## 2024-01-01 DIAGNOSIS — R45.89 SUICIDAL BEHAVIOR: Primary | ICD-10-CM

## 2024-01-01 DIAGNOSIS — T14.90XA TRAUMA: ICD-10-CM

## 2024-01-01 LAB
AMPHET UR QL SCN: NEGATIVE
APAP SERPL-MCNC: <10 UG/ML (ref 10–30)
APPEARANCE UR: CLEAR
BARBITURATE SCN PRESENT UR: NEGATIVE
BASOPHILS # BLD AUTO: 0.02 X10(3)/MCL (ref 0.01–0.08)
BASOPHILS NFR BLD AUTO: 0.4 % (ref 0.1–1.2)
BENZODIAZ UR QL SCN: NEGATIVE
BILIRUB UR QL STRIP.AUTO: NEGATIVE
BNP BLD-MCNC: 1220 PG/ML (ref 0–124.9)
CANNABINOIDS UR QL SCN: NEGATIVE
CK MB SERPL-MCNC: 3.7 NG/ML (ref 0–3.38)
CK SERPL-CCNC: 203 U/L (ref 30–135)
COCAINE UR QL SCN: NEGATIVE
COLOR UR AUTO: YELLOW
EOSINOPHIL # BLD AUTO: 0.03 X10(3)/MCL (ref 0.04–0.36)
EOSINOPHIL NFR BLD AUTO: 0.6 % (ref 0.7–7)
ERYTHROCYTE [DISTWIDTH] IN BLOOD BY AUTOMATED COUNT: 14.7 % (ref 11–14.5)
GLUCOSE UR QL STRIP.AUTO: NEGATIVE
HCT VFR BLD AUTO: 27.6 % (ref 36–48)
HGB BLD-MCNC: 9.3 G/DL (ref 11.8–16)
IMM GRANULOCYTES # BLD AUTO: 0.04 X10(3)/MCL (ref 0–0.03)
IMM GRANULOCYTES NFR BLD AUTO: 0.8 % (ref 0–0.5)
KETONES UR QL STRIP.AUTO: NEGATIVE
LEUKOCYTE ESTERASE UR QL STRIP.AUTO: NEGATIVE
LYMPHOCYTES # BLD AUTO: 0.8 X10(3)/MCL (ref 1.16–3.74)
LYMPHOCYTES NFR BLD AUTO: 16.6 % (ref 20–55)
MCH RBC QN AUTO: 30.1 PG (ref 27–34)
MCHC RBC AUTO-ENTMCNC: 33.7 G/DL (ref 31–37)
MCV RBC AUTO: 89.3 FL (ref 79–99)
METHADONE UR QL SCN: NEGATIVE
MONOCYTES # BLD AUTO: 0.54 X10(3)/MCL (ref 0.24–0.36)
MONOCYTES NFR BLD AUTO: 11.2 % (ref 4.7–12.5)
NEUTROPHILS # BLD AUTO: 3.39 X10(3)/MCL (ref 1.56–6.13)
NEUTROPHILS NFR BLD AUTO: 70.4 % (ref 37–73)
NITRITE UR QL STRIP.AUTO: NEGATIVE
NRBC BLD AUTO-RTO: 0 %
OPIATES UR QL SCN: NEGATIVE
PCP UR QL: NEGATIVE
PH UR STRIP.AUTO: 6 [PH]
PH UR: 6 [PH] (ref 3–11)
PLATELET # BLD AUTO: 250 X10(3)/MCL (ref 140–371)
PMV BLD AUTO: 10.1 FL (ref 9.4–12.4)
PROT UR QL STRIP.AUTO: ABNORMAL
RBC # BLD AUTO: 3.09 X10(6)/MCL (ref 4–5.1)
RBC UR QL AUTO: NEGATIVE
SARS-COV-2 RDRP RESP QL NAA+PROBE: NEGATIVE
SP GR UR STRIP.AUTO: 1.02 (ref 1–1.03)
TROPONIN I SERPL-MCNC: 0.02 NG/ML (ref 0–0.03)
UROBILINOGEN UR STRIP-ACNC: 0.2
WBC # SPEC AUTO: 4.82 X10(3)/MCL (ref 4–11.5)

## 2024-01-01 PROCEDURE — 93005 ELECTROCARDIOGRAM TRACING: CPT | Performed by: INTERNAL MEDICINE

## 2024-01-01 PROCEDURE — 85025 COMPLETE CBC W/AUTO DIFF WBC: CPT | Performed by: FAMILY MEDICINE

## 2024-01-01 PROCEDURE — 81003 URINALYSIS AUTO W/O SCOPE: CPT | Mod: 59 | Performed by: FAMILY MEDICINE

## 2024-01-01 PROCEDURE — 82553 CREATINE MB FRACTION: CPT | Performed by: FAMILY MEDICINE

## 2024-01-01 PROCEDURE — 93010 ELECTROCARDIOGRAM REPORT: CPT | Mod: ,,, | Performed by: INTERNAL MEDICINE

## 2024-01-01 PROCEDURE — 84484 ASSAY OF TROPONIN QUANT: CPT | Performed by: FAMILY MEDICINE

## 2024-01-01 PROCEDURE — 99285 EMERGENCY DEPT VISIT HI MDM: CPT | Mod: 25

## 2024-01-01 PROCEDURE — 63600175 PHARM REV CODE 636 W HCPCS: Performed by: FAMILY MEDICINE

## 2024-01-01 PROCEDURE — 96375 TX/PRO/DX INJ NEW DRUG ADDON: CPT

## 2024-01-01 PROCEDURE — 87635 SARS-COV-2 COVID-19 AMP PRB: CPT | Performed by: FAMILY MEDICINE

## 2024-01-01 PROCEDURE — 80143 DRUG ASSAY ACETAMINOPHEN: CPT | Performed by: FAMILY MEDICINE

## 2024-01-01 PROCEDURE — 93005 ELECTROCARDIOGRAM TRACING: CPT

## 2024-01-01 PROCEDURE — 83880 ASSAY OF NATRIURETIC PEPTIDE: CPT | Performed by: FAMILY MEDICINE

## 2024-01-01 PROCEDURE — 80307 DRUG TEST PRSMV CHEM ANLYZR: CPT | Performed by: FAMILY MEDICINE

## 2024-01-01 PROCEDURE — 96374 THER/PROPH/DIAG INJ IV PUSH: CPT

## 2024-01-01 PROCEDURE — 82550 ASSAY OF CK (CPK): CPT | Performed by: FAMILY MEDICINE

## 2024-01-01 RX ORDER — HYDRALAZINE HYDROCHLORIDE 20 MG/ML
10 INJECTION INTRAMUSCULAR; INTRAVENOUS
Status: COMPLETED | OUTPATIENT
Start: 2024-01-01 | End: 2024-01-01

## 2024-01-01 RX ORDER — DIPHENHYDRAMINE HYDROCHLORIDE 50 MG/ML
25 INJECTION, SOLUTION INTRAMUSCULAR; INTRAVENOUS
Status: COMPLETED | OUTPATIENT
Start: 2024-01-01 | End: 2024-01-01

## 2024-01-01 RX ADMIN — HYDRALAZINE HYDROCHLORIDE 10 MG: 20 INJECTION INTRAMUSCULAR; INTRAVENOUS at 04:01

## 2024-01-01 RX ADMIN — DIPHENHYDRAMINE HYDROCHLORIDE 25 MG: 50 INJECTION INTRAMUSCULAR; INTRAVENOUS at 05:01

## 2024-01-01 NOTE — ED PROVIDER NOTES
"Encounter Date: 1/1/2024       History     Chief Complaint   Patient presents with    Altered Mental Status    Fall     Onset x a few weeks the pt has been falling at home. Family report increase alter mental status. Ems report pt saying [facility] but I would [facility] myself before going to the nursing home." Pt has multiple bruising noted to body.      Patient presents to the emergency room via EMS transport.  Over the past few weeks, the patient has been increasingly falling and altered.  Daughter at bedside states that patient lives by herself and is not taking her medications as prescribed.  They increasingly concerned about her ability to take care of herself.  As of the past few weeks, the patient has been acting very bizarre, out of her head, and has been making suicidal threats.    The history is provided by the patient, the EMS personnel and a relative.     Review of patient's allergies indicates:   Allergen Reactions    Sulfa (sulfonamide antibiotics) Swelling     mouth    Acetaminophen Nausea And Vomiting     Facial swelling      Past Medical History:   Diagnosis Date    Anxiety     Arthritis     Ascending aorta dilatation     Asthma     Atrial fibrillation     Breast cancer     CAD (coronary artery disease)     Clotting disorder     JONES (dyspnea on exertion)     DVT (deep venous thrombosis)     Essential (primary) hypertension     GERD (gastroesophageal reflux disease)     Hypoglycemia     Hypothyroidism, unspecified     Insomnia     Migraines     Mixed hyperlipidemia     Myasthenia gravis without (acute) exacerbation     Pacemaker     PAF (paroxysmal atrial fibrillation)     TIA (transient ischemic attack)      Past Surgical History:   Procedure Laterality Date    APPENDECTOMY      BREAST LUMPECTOMY      CATARACT EXTRACTION      CHOLECYSTECTOMY      CLOSURE OF LEFT ATRIAL APPENDAGE USING DEVICE N/A 4/5/2023    Procedure: Left atrial appendage closure device;  Surgeon: Robert LINK" Daryl Winston MD;  Location: [facility] CATH LAB;  Service: Cardiology;  Laterality: N/A;  WATCHMAN W/ INTRA OP MALU    COLONOSCOPY      CORONARY STENT PLACEMENT  2018    ECHOCARDIOGRAM,TRANSESOPHAGEAL N/A 4/5/2023    Procedure: Transesophageal echo (MALU) intra-procedure log documentation;  Surgeon: Selina Diagnostic Provider;  Location: [facility] CATH LAB;  Service: Cardiology;  Laterality: N/A;    INSERTION OF PACEMAKER      PARTIAL HYSTERECTOMY       Family History   Problem Relation Age of Onset    Heart disease Father      Social History     Tobacco Use    Smoking status: Never    Smokeless tobacco: Never   Substance Use Topics    Alcohol use: Never    Drug use: Never     Review of Systems   Constitutional:  Negative for fever.   HENT:  Negative for sore throat.    Respiratory:  Negative for shortness of breath.    Cardiovascular:  Negative for chest pain.   Gastrointestinal:  Negative for nausea.   Genitourinary:  Negative for dysuria.   Musculoskeletal:  Negative for back pain.   Skin:  Negative for rash.   Neurological:  Negative for weakness.   Hematological:  Does not bruise/bleed easily.   Psychiatric/Behavioral:  Positive for confusion and suicidal ideas.    All other systems reviewed and are negative.      Physical Exam     Initial Vitals   BP Pulse Resp Temp SpO2   01/01/24 0237 01/01/24 0237 01/01/24 0237 01/01/24 0213 01/01/24 0237   (!) 159/96 72 18 98 °F (36.7 °C) 98 %      MAP       --                Physical Exam    Nursing note and vitals reviewed.  Constitutional:   Chronically ill-appearing elderly white female who has in no distress, but is making bizarre statements   HENT:   Head: Normocephalic and atraumatic.   Left periorbital ecchymoses.   Eyes: EOM are normal. Pupils are equal, round, and reactive to light.   Neck: Neck supple.   Normal range of motion.  Cardiovascular:  Normal rate, regular rhythm and normal heart sounds.           Pulmonary/Chest: Breath sounds normal.   Abdominal: Abdomen is  soft. Bowel sounds are normal. There is no abdominal tenderness.   Musculoskeletal:         General: No edema. Normal range of motion.      Cervical back: Normal range of motion and neck supple.      Comments: Left hip mildly tender.     Neurological: She is alert and oriented to person, place, and time.   Skin: Skin is warm and dry. Capillary refill takes less than 2 seconds.   Psychiatric:   Pt is confused, and admits to making suicidal statements earlier         ED Course   Procedures  Labs Reviewed   URINALYSIS, REFLEX TO URINE CULTURE - Abnormal; Notable for the following components:       Result Value    Protein, UA Trace (*)     All other components within normal limits    Narrative:      URINE STABILITY IS 2 HOURS AT ROOM TEMP OR    SIX HOURS REFRIGERATED. PERFORMING TESTING ON    SPECIMENS GREATER THAN THIS AGE MAY AFFECT THE    FOLLOWING TESTS:    PH          SPECIFIC GRAVITY           BLOOD    CLARITY     BILIRUBIN               UROBILINOGEN   CK - Abnormal; Notable for the following components:    Creatine Kinase 203 (*)     All other components within normal limits   CK-MB - Abnormal; Notable for the following components:    Creatine Kinase MB 3.70 (*)     All other components within normal limits   ACETAMINOPHEN LEVEL - Abnormal; Notable for the following components:    Acetaminophen Level <10.0 (*)     All other components within normal limits   CBC WITH DIFFERENTIAL - Abnormal; Notable for the following components:    RBC 3.09 (*)     Hgb 9.3 (*)     Hct 27.6 (*)     RDW 14.7 (*)     Lymph % 16.6 (*)     Eos % 0.6 (*)     Lymph # 0.80 (*)     Mono # 0.54 (*)     Eos # 0.03 (*)     IG# 0.04 (*)     IG% 0.8 (*)     All other components within normal limits   DRUG SCREEN, URINE (BEAKER) - Normal    Narrative:     Cut off concentrations:    Amphetamines - 1000 ng/ml  Barbiturates - 200 ng/ml  Benzodiazepine - 200 ng/ml  Cannabinoids (THC) - 50 ng/ml  Cocaine - 300 ng/ml  Fentanyl - 1.0 ng/ml  MDMA - 500  ng/ml  Opiates - 300 ng/ml   Phencyclidine (PCP) - 25 ng/ml  Methadone - 300 ng/ml      False negatives may result form substances such as bleach added to urine.  False positives may result for the presence of a substance with similar chemical structure to the drug or its metabolite.    This test provides only a PRELIMINARY analytical test result. A more specific alternate chemical method must be used in order to obtain a confirmed analytical result. Gas chromatography/mass spectrometry (GC/MS) is the preferred confirmatory method. Other chemical confirmation methods are available. Clinical consideration and professional judgement should be applied to any drug of abuse test result, particularly when preliminary positive results are used.    Positive results will be confirmed only at the physicians request. Unconfirmed screening results are to be used only for medical purposes (treatment).          TROPONIN I - Normal   SARS-COV-2 RNA AMPLIFICATION, QUAL - Normal   CBC W/ AUTO DIFFERENTIAL    Narrative:     The following orders were created for panel order CBC Auto Differential.  Procedure                               Abnormality         Status                     ---------                               -----------         ------                     CBC with Differential[[id number]]       Abnormal            Final result                 Please view results for these tests on the individual orders.          Imaging Results              X-Ray Hip 2 or 3 views Left (with Pelvis when performed) (In process)                      CT Head Without Contrast (Preliminary result)  Result time 01/01/24 03:21:04      Preliminary result by Norberto Jones MD (01/01/24 03:21:04)                   Narrative:    START OF REPORT:  Technique: CT of the head was performed without intravenous contrast with axial as well as coronal and sagittal images.    Comparison: None.    Dosage Information: Automated exposure control was  utilized.    Clinical history: SEVERAL RECENT FALLS - FAMILY REPORTS INCREASED AMS.    Findings:  Hemorrhage: No acute intracranial hemorrhage is seen.  CSF spaces: The ventricles, sulci and basal cisterns all appear moderately prominent consistent with global cerebral atrophy.  Brain parenchyma: Unremarkable with preservation of the grey white junction throughout. No acute infarct. Moderate microvascular change is seen in portions of the periventricular and deep white matter tracts.  Cerebellum: Unremarkable.  Vascular: Atheromatous calcification of the intracranial arteries is seen.  Sella and skull base: The sella appears to be within normal limits for age.  Cerebellopontine angles: Within normal limits.  Herniation: None.  Intracranial calcifications: Incidental note is made of bilateral choroid plexus calcification. Incidental note is made of some pineal region calcification.  Calvarium: No acute linear or depressed skull fracture is seen.    Maxillofacial Structures:  Paranasal sinuses: The visualized paranasal sinuses appear clear with no mucoperiosteal thickening or air fluid levels identified.  Orbits: The orbits appear unremarkable.  Zygomatic arches: The zygomatic arches are intact and unremarkable.  Temporal bones and mastoids: The temporal bones and mastoids appear unremarkable.  TMJ: The mandibular condyles appear normally placed with respect to the mandibular fossa.      Impression:  1. No acute intracranial process identified. Details and other findings as noted above.                                         X-Ray Chest AP Portable (In process)    Procedure changed from X-Ray Chest PA And Lateral                    Medications   hydrALAZINE injection 10 mg (has no administration in time range)     Medical Decision Making  CBC, Chem 12, urinalysis normal    CT brain normal left hip no fractures    Patient was currently psychotic, and benefit from medication regulation.  Pec and recommend long-term  placement possibly in assisted living or nursing home after that.    Amount and/or Complexity of Data Reviewed  Labs: ordered.  Radiology: ordered.                  Medically cleared for psychiatry placement: 1/1/2024  4:25 AM                   Clinical Impression:  Final diagnoses:  [R45.89] Suicidal behavior (Primary)  [T14.90XA] Trauma          ED Disposition Condition    Transfer to Psych Facility Stable          ED Prescriptions    None       Follow-up Information    None          Chirag Elizabeth MD  01/01/24 0425

## 2024-01-01 NOTE — ED PROVIDER NOTES
"Encounter Date: 1/1/2024       History     Chief Complaint   Patient presents with    Altered Mental Status    Fall     Onset x a few weeks the pt has been falling at home. Family report increase alter mental status. Ems report pt saying [facility] before [facility] to the [facility] home." Pt has multiple bruising noted to body.      HPI  Review of patient's allergies indicates:   Allergen Reactions    Sulfa (sulfonamide antibiotics) Swelling     mouth    Acetaminophen Nausea And Vomiting     Facial swelling      Past Medical History:   Diagnosis Date    Anxiety     Arthritis     Ascending aorta dilatation     Asthma     Atrial fibrillation     Breast cancer     CAD (coronary artery disease)     Clotting disorder     JONES (dyspnea on exertion)     DVT (deep venous thrombosis)     Essential (primary) hypertension     GERD (gastroesophageal reflux disease)     Hypoglycemia     Hypothyroidism, unspecified     Insomnia     Migraines     Mixed hyperlipidemia     Myasthenia gravis without (acute) exacerbation     Pacemaker     PAF (paroxysmal atrial fibrillation)     TIA (transient ischemic attack)      Past Surgical History:   Procedure Laterality Date    APPENDECTOMY      BREAST LUMPECTOMY      CATARACT EXTRACTION      CHOLECYSTECTOMY      CLOSURE OF LEFT ATRIAL APPENDAGE USING DEVICE N/A 4/5/2023    Procedure: Left atrial appendage closure device;  Surgeon: Robert Winston MD;  Location: [facility] CATH LAB;  Service: Cardiology;  Laterality: N/A;  WATCHMAN W/ INTRA OP MALU    COLONOSCOPY      CORONARY STENT PLACEMENT  2018    ECHOCARDIOGRAM,TRANSESOPHAGEAL N/A 4/5/2023    Procedure: Transesophageal echo (MALU) intra-procedure log documentation;  Surgeon: Selina Diagnostic Provider;  Location: [facility] CATH LAB;  Service: Cardiology;  Laterality: N/A;    INSERTION OF PACEMAKER      PARTIAL HYSTERECTOMY       Family History   Problem Relation Age of Onset    Heart disease Father  "     Social History     Tobacco Use    Smoking status: Never    Smokeless tobacco: Never   Substance Use Topics    Alcohol use: Never    Drug use: Never     Review of Systems    Physical Exam     Initial Vitals   BP Pulse Resp Temp SpO2   01/01/24 0237 01/01/24 0237 01/01/24 0237 01/01/24 0213 01/01/24 0237   (!) 159/96 72 18 98 °F (36.7 °C) 98 %      MAP       --                Physical Exam    ED Course   Procedures  Labs Reviewed   URINALYSIS, REFLEX TO URINE CULTURE - Abnormal; Notable for the following components:       Result Value    Protein, UA Trace (*)     All other components within normal limits    Narrative:      URINE STABILITY IS 2 HOURS AT ROOM TEMP OR    SIX HOURS REFRIGERATED. PERFORMING TESTING ON    SPECIMENS GREATER THAN THIS AGE MAY AFFECT THE    FOLLOWING TESTS:    PH          SPECIFIC GRAVITY           BLOOD    CLARITY     BILIRUBIN               UROBILINOGEN   CK - Abnormal; Notable for the following components:    Creatine Kinase 203 (*)     All other components within normal limits   CK-MB - Abnormal; Notable for the following components:    Creatine Kinase MB 3.70 (*)     All other components within normal limits   ACETAMINOPHEN LEVEL - Abnormal; Notable for the following components:    Acetaminophen Level <10.0 (*)     All other components within normal limits   CBC WITH DIFFERENTIAL - Abnormal; Notable for the following components:    RBC 3.09 (*)     Hgb 9.3 (*)     Hct 27.6 (*)     RDW 14.7 (*)     Lymph % 16.6 (*)     Eos % 0.6 (*)     Lymph # 0.80 (*)     Mono # 0.54 (*)     Eos # 0.03 (*)     IG# 0.04 (*)     IG% 0.8 (*)     All other components within normal limits   DRUG SCREEN, URINE (BEAKER) - Normal    Narrative:     Cut off concentrations:    Amphetamines - 1000 ng/ml  Barbiturates - 200 ng/ml  Benzodiazepine - 200 ng/ml  Cannabinoids (THC) - 50 ng/ml  Cocaine - 300 ng/ml  Fentanyl - 1.0 ng/ml  MDMA - 500 ng/ml  Opiates - 300 ng/ml   Phencyclidine (PCP) - 25 ng/ml  Methadone -  300 ng/ml      False negatives may result form substances such as bleach added to urine.  False positives may result for the presence of a substance with similar chemical structure to the drug or its metabolite.    This test provides only a PRELIMINARY analytical test result. A more specific alternate chemical method must be used in order to obtain a confirmed analytical result. Gas chromatography/mass spectrometry (GC/MS) is the preferred confirmatory method. Other chemical confirmation methods are available. Clinical consideration and professional judgement should be applied to any drug of abuse test result, particularly when preliminary positive results are used.    Positive results will be confirmed only at the physicians request. Unconfirmed screening results are to be used only for medical purposes (treatment).          TROPONIN I - Normal   SARS-COV-2 RNA AMPLIFICATION, QUAL - Normal   CBC W/ AUTO DIFFERENTIAL    Narrative:     The following orders were created for panel order CBC Auto Differential.  Procedure                               Abnormality         Status                     ---------                               -----------         ------                     CBC with Differential[[id number]]       Abnormal            Final result                 Please view results for these tests on the individual orders.   SALICYLATE LEVEL          Imaging Results              X-Ray Hip 2 or 3 views Left (with Pelvis when performed) (Preliminary result)  Result time 01/01/24 05:25:50      Wet Read by Rick Cook MD (01/01/24 05:25:50, [facility], Emergency Medicine)    No acute pathology                                     CT Head Without Contrast (Preliminary result)  Result time 01/01/24 03:21:04      Preliminary result by Norberto Jones MD (01/01/24 03:21:04)                   Narrative:    START OF REPORT:  Technique: CT of the head was performed without intravenous  contrast with axial as well as coronal and sagittal images.    Comparison: None.    Dosage Information: Automated exposure control was utilized.    Clinical history: SEVERAL RECENT FALLS - FAMILY REPORTS INCREASED AMS.    Findings:  Hemorrhage: No acute intracranial hemorrhage is seen.  CSF spaces: The ventricles, sulci and basal cisterns all appear moderately prominent consistent with global cerebral atrophy.  Brain parenchyma: Unremarkable with preservation of the grey white junction throughout. No acute infarct. Moderate microvascular change is seen in portions of the periventricular and deep white matter tracts.  Cerebellum: Unremarkable.  Vascular: Atheromatous calcification of the intracranial arteries is seen.  Sella and skull base: The sella appears to be within normal limits for age.  Cerebellopontine angles: Within normal limits.  Herniation: None.  Intracranial calcifications: Incidental note is made of bilateral choroid plexus calcification. Incidental note is made of some pineal region calcification.  Calvarium: No acute linear or depressed skull fracture is seen.    Maxillofacial Structures:  Paranasal sinuses: The visualized paranasal sinuses appear clear with no mucoperiosteal thickening or air fluid levels identified.  Orbits: The orbits appear unremarkable.  Zygomatic arches: The zygomatic arches are intact and unremarkable.  Temporal bones and mastoids: The temporal bones and mastoids appear unremarkable.  TMJ: The mandibular condyles appear normally placed with respect to the mandibular fossa.      Impression:  1. No acute intracranial process identified. Details and other findings as noted above.                                         X-Ray Chest AP Portable (Preliminary result)  Result time 01/01/24 05:25:01   Procedure changed from X-Ray Chest PA And Lateral     Wet Read by Rick Cook MD (01/01/24 05:25:01, [facility])    No  consolidation/infiltrate                                     Medications   diphenhydrAMINE injection 25 mg (has no administration in time range)   hydrALAZINE injection 10 mg (10 mg Intravenous Given 1/1/24 0430)     Medical Decision Making  Received in shift change.  Daughters at bedside.  Patient is stable without acute complaints.  Psychiatric placement efforts continue.    Amount and/or Complexity of Data Reviewed  Labs: ordered.  Radiology: ordered and independent interpretation performed.    Risk  Prescription drug management.                  Medically cleared for psychiatry placement: 1/1/2024  4:25 AM                   Clinical Impression:  Final diagnoses:  [R45.89] Suicidal behavior (Primary)  [T14.90XA] Trauma          ED Disposition Condition    Transfer to Psych Facility Stable          ED Prescriptions    None       Follow-up Information    None          Rick Cook MD  01/01/24 [id number]

## 2024-01-01 NOTE — ED NOTES
Generalized itching since Hydralazine , facial redness, no rash noted - no SOB - PT without c/o except itching

## 2024-01-06 ENCOUNTER — OUTSIDE PLACE OF SERVICE (OUTPATIENT)
Dept: FAMILY MEDICINE | Facility: CLINIC | Age: 89
End: 2024-01-06
Payer: MEDICARE

## 2024-01-06 PROCEDURE — 99231 SBSQ HOSP IP/OBS SF/LOW 25: CPT | Mod: ,,, | Performed by: FAMILY MEDICINE

## 2024-01-08 ENCOUNTER — HOSPITAL ENCOUNTER (OUTPATIENT)
Dept: RADIOLOGY | Facility: HOSPITAL | Age: 89
Discharge: HOME | End: 2024-01-08
Attending: PSYCHIATRY & NEUROLOGY
Payer: MEDICARE

## 2024-01-08 ENCOUNTER — OUTSIDE PLACE OF SERVICE (OUTPATIENT)
Dept: FAMILY MEDICINE | Facility: CLINIC | Age: 89
End: 2024-01-08
Payer: MEDICARE

## 2024-01-08 DIAGNOSIS — M54.9 BACK PAIN: ICD-10-CM

## 2024-01-09 PROCEDURE — 99231 SBSQ HOSP IP/OBS SF/LOW 25: CPT | Mod: ,,, | Performed by: FAMILY MEDICINE

## 2024-01-12 ENCOUNTER — LAB REQUISITION (OUTPATIENT)
Dept: LAB | Facility: HOSPITAL | Age: 89
End: 2024-01-12
Payer: MEDICARE

## 2024-01-12 DIAGNOSIS — I10 ESSENTIAL (PRIMARY) HYPERTENSION: ICD-10-CM

## 2024-01-12 LAB
ANION GAP SERPL CALC-SCNC: 7 MEQ/L (ref 2–13)
BASOPHILS # BLD AUTO: 0.02 X10(3)/MCL (ref 0.01–0.08)
BASOPHILS NFR BLD AUTO: 0.3 % (ref 0.1–1.2)
BUN SERPL-MCNC: 29 MG/DL (ref 7–20)
CALCIUM SERPL-MCNC: 9.1 MG/DL (ref 8.4–10.2)
CHLORIDE SERPL-SCNC: 106 MMOL/L (ref 98–110)
CO2 SERPL-SCNC: 27 MMOL/L (ref 21–32)
CREAT SERPL-MCNC: 0.86 MG/DL (ref 0.66–1.25)
CREAT/UREA NIT SERPL: 34 (ref 12–20)
EOSINOPHIL # BLD AUTO: 0.05 X10(3)/MCL (ref 0.04–0.36)
EOSINOPHIL NFR BLD AUTO: 0.8 % (ref 0.7–7)
ERYTHROCYTE [DISTWIDTH] IN BLOOD BY AUTOMATED COUNT: 14.6 % (ref 11–14.5)
GFR SERPLBLD CREATININE-BSD FMLA CKD-EPI: 64 MLS/MIN/1.73/M2
GLUCOSE SERPL-MCNC: 146 MG/DL (ref 70–115)
HCT VFR BLD AUTO: 30.5 % (ref 36–48)
HGB BLD-MCNC: 9.7 G/DL (ref 11.8–16)
IMM GRANULOCYTES # BLD AUTO: 0.02 X10(3)/MCL (ref 0–0.03)
IMM GRANULOCYTES NFR BLD AUTO: 0.3 % (ref 0–0.5)
LYMPHOCYTES # BLD AUTO: 0.47 X10(3)/MCL (ref 1.16–3.74)
LYMPHOCYTES NFR BLD AUTO: 7.8 % (ref 20–55)
MCH RBC QN AUTO: 29.8 PG (ref 27–34)
MCHC RBC AUTO-ENTMCNC: 31.8 G/DL (ref 31–37)
MCV RBC AUTO: 93.8 FL (ref 79–99)
MONOCYTES # BLD AUTO: 0.53 X10(3)/MCL (ref 0.24–0.36)
MONOCYTES NFR BLD AUTO: 8.8 % (ref 4.7–12.5)
NEUTROPHILS # BLD AUTO: 4.93 X10(3)/MCL (ref 1.56–6.13)
NEUTROPHILS NFR BLD AUTO: 82 % (ref 37–73)
NRBC BLD AUTO-RTO: 0 %
PLATELET # BLD AUTO: 286 X10(3)/MCL (ref 140–371)
PMV BLD AUTO: 10.9 FL (ref 9.4–12.4)
POTASSIUM SERPL-SCNC: 4.6 MMOL/L (ref 3.5–5.1)
RBC # BLD AUTO: 3.25 X10(6)/MCL (ref 4–5.1)
SODIUM SERPL-SCNC: 140 MMOL/L (ref 135–145)
WBC # SPEC AUTO: 6.02 X10(3)/MCL (ref 4–11.5)

## 2024-01-12 PROCEDURE — 80048 BASIC METABOLIC PNL TOTAL CA: CPT | Performed by: PSYCHIATRY & NEUROLOGY

## 2024-01-12 PROCEDURE — 85025 COMPLETE CBC W/AUTO DIFF WBC: CPT | Performed by: PSYCHIATRY & NEUROLOGY

## 2024-01-14 ENCOUNTER — OUTSIDE PLACE OF SERVICE (OUTPATIENT)
Dept: FAMILY MEDICINE | Facility: CLINIC | Age: 89
End: 2024-01-14
Payer: MEDICARE

## 2024-01-14 ENCOUNTER — LAB REQUISITION (OUTPATIENT)
Dept: LAB | Facility: HOSPITAL | Age: 89
End: 2024-01-14
Payer: MEDICARE

## 2024-01-14 DIAGNOSIS — R06.5 MOUTH BREATHING: ICD-10-CM

## 2024-01-14 LAB
INFLUENZA A (OHS): NEGATIVE
INFLUENZA B (OHS): NEGATIVE

## 2024-01-14 PROCEDURE — 99232 SBSQ HOSP IP/OBS MODERATE 35: CPT | Mod: ,,, | Performed by: FAMILY MEDICINE

## 2024-01-14 PROCEDURE — 87400 INFLUENZA A/B EACH AG IA: CPT | Performed by: FAMILY MEDICINE

## 2024-01-17 ENCOUNTER — LAB REQUISITION (OUTPATIENT)
Dept: LAB | Facility: HOSPITAL | Age: 89
End: 2024-01-17
Payer: MEDICARE

## 2024-01-17 DIAGNOSIS — R50.9 FEVER, UNSPECIFIED: ICD-10-CM

## 2024-01-17 LAB
INFLUENZA A (OHS): NEGATIVE
INFLUENZA B (OHS): NEGATIVE

## 2024-01-17 PROCEDURE — 87400 INFLUENZA A/B EACH AG IA: CPT | Performed by: INTERNAL MEDICINE

## 2024-01-18 ENCOUNTER — LAB REQUISITION (OUTPATIENT)
Dept: LAB | Facility: HOSPITAL | Age: 89
End: 2024-01-18
Payer: MEDICARE

## 2024-01-18 DIAGNOSIS — I10 ESSENTIAL (PRIMARY) HYPERTENSION: ICD-10-CM

## 2024-01-18 DIAGNOSIS — E03.9 HYPOTHYROIDISM, UNSPECIFIED: ICD-10-CM

## 2024-01-18 DIAGNOSIS — E53.9 VITAMIN B DEFICIENCY, UNSPECIFIED: ICD-10-CM

## 2024-01-18 DIAGNOSIS — D64.9 ANEMIA, UNSPECIFIED: ICD-10-CM

## 2024-01-18 LAB
ALBUMIN SERPL-MCNC: 2.9 G/DL (ref 3.4–5)
ALBUMIN/GLOB SERPL: 1 RATIO
ALP SERPL-CCNC: 58 UNIT/L (ref 50–144)
ALT SERPL-CCNC: 25 UNIT/L (ref 1–45)
ANION GAP SERPL CALC-SCNC: 4 MEQ/L (ref 2–13)
AST SERPL-CCNC: 31 UNIT/L (ref 14–36)
BASOPHILS # BLD AUTO: 0.02 X10(3)/MCL (ref 0.01–0.08)
BASOPHILS NFR BLD AUTO: 0.3 % (ref 0.1–1.2)
BILIRUB SERPL-MCNC: 0.2 MG/DL (ref 0–1)
BUN SERPL-MCNC: 33 MG/DL (ref 7–20)
CALCIUM SERPL-MCNC: 8.4 MG/DL (ref 8.4–10.2)
CHLORIDE SERPL-SCNC: 109 MMOL/L (ref 98–110)
CO2 SERPL-SCNC: 26 MMOL/L (ref 21–32)
CREAT SERPL-MCNC: 1.13 MG/DL (ref 0.66–1.25)
CREAT/UREA NIT SERPL: 29 (ref 12–20)
EOSINOPHIL # BLD AUTO: 0.07 X10(3)/MCL (ref 0.04–0.36)
EOSINOPHIL NFR BLD AUTO: 1.1 % (ref 0.7–7)
ERYTHROCYTE [DISTWIDTH] IN BLOOD BY AUTOMATED COUNT: 14.3 % (ref 11–14.5)
GFR SERPLBLD CREATININE-BSD FMLA CKD-EPI: 46 MLS/MIN/1.73/M2
GLOBULIN SER-MCNC: 2.8 GM/DL (ref 2–3.9)
GLUCOSE SERPL-MCNC: 132 MG/DL (ref 70–115)
HCT VFR BLD AUTO: 25.4 % (ref 36–48)
HGB BLD-MCNC: 8.1 G/DL (ref 11.8–16)
IMM GRANULOCYTES # BLD AUTO: 0.1 X10(3)/MCL (ref 0–0.03)
IMM GRANULOCYTES NFR BLD AUTO: 1.5 % (ref 0–0.5)
LYMPHOCYTES # BLD AUTO: 0.5 X10(3)/MCL (ref 1.16–3.74)
LYMPHOCYTES NFR BLD AUTO: 7.6 % (ref 20–55)
MCH RBC QN AUTO: 29.1 PG (ref 27–34)
MCHC RBC AUTO-ENTMCNC: 31.9 G/DL (ref 31–37)
MCV RBC AUTO: 91.4 FL (ref 79–99)
MONOCYTES # BLD AUTO: 0.75 X10(3)/MCL (ref 0.24–0.36)
MONOCYTES NFR BLD AUTO: 11.5 % (ref 4.7–12.5)
NEUTROPHILS # BLD AUTO: 5.1 X10(3)/MCL (ref 1.56–6.13)
NEUTROPHILS NFR BLD AUTO: 78 % (ref 37–73)
NRBC BLD AUTO-RTO: 0 %
PLATELET # BLD AUTO: 258 X10(3)/MCL (ref 140–371)
PMV BLD AUTO: 11 FL (ref 9.4–12.4)
POTASSIUM SERPL-SCNC: 4.4 MMOL/L (ref 3.5–5.1)
PROT SERPL-MCNC: 5.7 GM/DL (ref 6.3–8.2)
RBC # BLD AUTO: 2.78 X10(6)/MCL (ref 4–5.1)
SODIUM SERPL-SCNC: 139 MMOL/L (ref 135–145)
TSH SERPL-ACNC: 29.1 UIU/ML (ref 0.36–3.74)
VIT B12 SERPL-MCNC: 616 PG/ML (ref 211–946)
WBC # SPEC AUTO: 6.54 X10(3)/MCL (ref 4–11.5)

## 2024-01-18 PROCEDURE — 80053 COMPREHEN METABOLIC PANEL: CPT | Performed by: INTERNAL MEDICINE

## 2024-01-18 PROCEDURE — 85025 COMPLETE CBC W/AUTO DIFF WBC: CPT | Performed by: INTERNAL MEDICINE

## 2024-01-18 PROCEDURE — 82607 VITAMIN B-12: CPT | Performed by: INTERNAL MEDICINE

## 2024-01-18 PROCEDURE — 84443 ASSAY THYROID STIM HORMONE: CPT | Performed by: INTERNAL MEDICINE

## 2024-01-20 ENCOUNTER — LAB REQUISITION (OUTPATIENT)
Dept: LAB | Facility: HOSPITAL | Age: 89
End: 2024-01-20
Payer: MEDICARE

## 2024-01-20 DIAGNOSIS — D64.9 ANEMIA, UNSPECIFIED: ICD-10-CM

## 2024-01-20 LAB
FERRITIN SERPL-MCNC: 27.8 NG/ML (ref 6.24–264)
IRON SERPL-MCNC: 27 UG/DL (ref 37–170)

## 2024-01-20 PROCEDURE — 82728 ASSAY OF FERRITIN: CPT | Performed by: INTERNAL MEDICINE

## 2024-01-20 PROCEDURE — 83540 ASSAY OF IRON: CPT | Performed by: INTERNAL MEDICINE

## 2024-01-21 ENCOUNTER — LAB REQUISITION (OUTPATIENT)
Dept: LAB | Facility: HOSPITAL | Age: 89
End: 2024-01-21
Payer: MEDICARE

## 2024-01-21 DIAGNOSIS — N39.0 URINARY TRACT INFECTION, SITE NOT SPECIFIED: ICD-10-CM

## 2024-01-21 LAB
APPEARANCE UR: CLEAR
BILIRUB UR QL STRIP.AUTO: NEGATIVE
COLOR UR AUTO: YELLOW
GLUCOSE UR QL STRIP.AUTO: NEGATIVE
KETONES UR QL STRIP.AUTO: NEGATIVE
LEUKOCYTE ESTERASE UR QL STRIP.AUTO: NEGATIVE
NITRITE UR QL STRIP.AUTO: NEGATIVE
PH UR STRIP.AUTO: 5.5 [PH]
PROT UR QL STRIP.AUTO: NEGATIVE
RBC UR QL AUTO: NEGATIVE
SP GR UR STRIP.AUTO: 1.02 (ref 1–1.03)
UROBILINOGEN UR STRIP-ACNC: 0.2

## 2024-01-21 PROCEDURE — 81003 URINALYSIS AUTO W/O SCOPE: CPT | Performed by: INTERNAL MEDICINE

## 2024-01-31 ENCOUNTER — LAB REQUISITION (OUTPATIENT)
Dept: LAB | Facility: HOSPITAL | Age: 89
End: 2024-01-31
Attending: INTERNAL MEDICINE
Payer: MEDICARE

## 2024-01-31 DIAGNOSIS — I10 ESSENTIAL (PRIMARY) HYPERTENSION: ICD-10-CM

## 2024-01-31 LAB
ANION GAP SERPL CALC-SCNC: 6 MEQ/L (ref 2–13)
BASOPHILS # BLD AUTO: 0.01 X10(3)/MCL (ref 0.01–0.08)
BASOPHILS NFR BLD AUTO: 0.2 % (ref 0.1–1.2)
BUN SERPL-MCNC: 22 MG/DL (ref 7–20)
CALCIUM SERPL-MCNC: 8.9 MG/DL (ref 8.4–10.2)
CHLORIDE SERPL-SCNC: 107 MMOL/L (ref 98–110)
CO2 SERPL-SCNC: 26 MMOL/L (ref 21–32)
CREAT SERPL-MCNC: 0.89 MG/DL (ref 0.66–1.25)
CREAT/UREA NIT SERPL: 25 (ref 12–20)
EOSINOPHIL # BLD AUTO: 0.06 X10(3)/MCL (ref 0.04–0.36)
EOSINOPHIL NFR BLD AUTO: 1.4 % (ref 0.7–7)
ERYTHROCYTE [DISTWIDTH] IN BLOOD BY AUTOMATED COUNT: 15.4 % (ref 11–14.5)
GFR SERPLBLD CREATININE-BSD FMLA CKD-EPI: 62 MLS/MIN/1.73/M2
GLUCOSE SERPL-MCNC: 130 MG/DL (ref 70–115)
HCT VFR BLD AUTO: 26.8 % (ref 36–48)
HGB BLD-MCNC: 8.4 G/DL (ref 11.8–16)
IMM GRANULOCYTES # BLD AUTO: 0.04 X10(3)/MCL (ref 0–0.03)
IMM GRANULOCYTES NFR BLD AUTO: 0.9 % (ref 0–0.5)
LYMPHOCYTES # BLD AUTO: 0.79 X10(3)/MCL (ref 1.16–3.74)
LYMPHOCYTES NFR BLD AUTO: 18.5 % (ref 20–55)
MCH RBC QN AUTO: 28.8 PG (ref 27–34)
MCHC RBC AUTO-ENTMCNC: 31.3 G/DL (ref 31–37)
MCV RBC AUTO: 91.8 FL (ref 79–99)
MONOCYTES # BLD AUTO: 0.56 X10(3)/MCL (ref 0.24–0.36)
MONOCYTES NFR BLD AUTO: 13.1 % (ref 4.7–12.5)
NEUTROPHILS # BLD AUTO: 2.8 X10(3)/MCL (ref 1.56–6.13)
NEUTROPHILS NFR BLD AUTO: 65.9 % (ref 37–73)
NRBC BLD AUTO-RTO: 0 %
PLATELET # BLD AUTO: 245 X10(3)/MCL (ref 140–371)
PMV BLD AUTO: 9.8 FL (ref 9.4–12.4)
POTASSIUM SERPL-SCNC: 4.3 MMOL/L (ref 3.5–5.1)
RBC # BLD AUTO: 2.92 X10(6)/MCL (ref 4–5.1)
SODIUM SERPL-SCNC: 139 MMOL/L (ref 135–145)
WBC # SPEC AUTO: 4.26 X10(3)/MCL (ref 4–11.5)

## 2024-01-31 PROCEDURE — 80048 BASIC METABOLIC PNL TOTAL CA: CPT | Performed by: INTERNAL MEDICINE

## 2024-01-31 PROCEDURE — 85025 COMPLETE CBC W/AUTO DIFF WBC: CPT | Performed by: INTERNAL MEDICINE

## 2024-02-11 ENCOUNTER — HOSPITAL ENCOUNTER (EMERGENCY)
Facility: HOSPITAL | Age: 89
Discharge: HOME | End: 2024-02-11
Payer: MEDICARE

## 2024-02-11 VITALS
WEIGHT: 106 LBS | DIASTOLIC BLOOD PRESSURE: 105 MMHG | BODY MASS INDEX: 20.7 KG/M2 | OXYGEN SATURATION: 99 % | RESPIRATION RATE: 20 BRPM | TEMPERATURE: 97 F | HEART RATE: 43 BPM | SYSTOLIC BLOOD PRESSURE: 186 MMHG

## 2024-02-11 DIAGNOSIS — S09.90XA MINOR HEAD INJURY, INITIAL ENCOUNTER: ICD-10-CM

## 2024-02-11 DIAGNOSIS — S40.012A CONTUSION OF LEFT SHOULDER, INITIAL ENCOUNTER: ICD-10-CM

## 2024-02-11 DIAGNOSIS — S70.01XA CONTUSION OF RIGHT HIP, INITIAL ENCOUNTER: ICD-10-CM

## 2024-02-11 DIAGNOSIS — S20.211A CONTUSION OF RIGHT FRONT WALL OF THORAX, INITIAL ENCOUNTER: Primary | ICD-10-CM

## 2024-02-11 PROCEDURE — 99284 EMERGENCY DEPT VISIT MOD MDM: CPT | Mod: 25

## 2024-02-12 NOTE — ED PROVIDER NOTES
Encounter Date: 2/11/2024       History     Chief Complaint   Patient presents with    Fall     Arrives from [facility] with c/o falling onto floor while pushing bathroom door open quickly. Reports falling onto (L) shoulder with pain, forehead pain, rolling over to (R) side with now (R) hip pain, and (R) rib area pain.      89-year-old female fell onto floor prior to arrival.  She complains of multiple areas of pain, including her head, her left shoulder, her right hip and her right ribs.  There was no loss of consciousness.    The history is provided by the patient.     Review of patient's allergies indicates:   Allergen Reactions    Sulfa (sulfonamide antibiotics) Swelling     mouth    Acetaminophen Nausea And Vomiting     Facial swelling      Past Medical History:   Diagnosis Date    Anxiety     Arthritis     Ascending aorta dilatation     Asthma     Atrial fibrillation     Breast cancer     CAD (coronary artery disease)     Clotting disorder     JONES (dyspnea on exertion)     DVT (deep venous thrombosis)     Essential (primary) hypertension     GERD (gastroesophageal reflux disease)     Hypoglycemia     Hypothyroidism, unspecified     Insomnia     Migraines     Mixed hyperlipidemia     Myasthenia gravis without (acute) exacerbation     Pacemaker     PAF (paroxysmal atrial fibrillation)     TIA (transient ischemic attack)      Past Surgical History:   Procedure Laterality Date    APPENDECTOMY      BREAST LUMPECTOMY      CATARACT EXTRACTION      CHOLECYSTECTOMY      CLOSURE OF LEFT ATRIAL APPENDAGE USING DEVICE N/A 4/5/2023    Procedure: Left atrial appendage closure device;  Surgeon: Robert Winston MD;  Location: [facility] CATH LAB;  Service: Cardiology;  Laterality: N/A;  WATCHMAN W/ INTRA OP MALU    COLONOSCOPY      CORONARY STENT PLACEMENT  2018    ECHOCARDIOGRAM,TRANSESOPHAGEAL N/A 4/5/2023    Procedure: Transesophageal echo (MALU) intra-procedure log documentation;  Surgeon: [facility] Diagnostic Provider;   Location: [facility] CATH LAB;  Service: Cardiology;  Laterality: N/A;    INSERTION OF PACEMAKER      PARTIAL HYSTERECTOMY       Family History   Problem Relation Age of Onset    Heart disease Father      Social History     Tobacco Use    Smoking status: Never    Smokeless tobacco: Never   Substance Use Topics    Alcohol use: Never    Drug use: Never     Review of Systems   Constitutional:  Negative for fever.   HENT:  Negative for sore throat.    Respiratory:  Negative for shortness of breath.    Cardiovascular:  Negative for chest pain.   Gastrointestinal:  Negative for nausea.   Genitourinary:  Negative for dysuria.   Musculoskeletal:  Negative for back pain.        Pain in Left shoulder, right ribs, right hip   Skin:  Negative for rash.   Neurological:  Negative for weakness.   Hematological:  Does not bruise/bleed easily.   All other systems reviewed and are negative.      Physical Exam     Initial Vitals   BP Pulse Resp Temp SpO2   02/11/24 1916 02/11/24 1916 02/11/24 1916 02/11/24 1916 02/11/24 1918   (!) 168/96 70 18 97.1 °F (36.2 °C) 99 %      MAP       --                Physical Exam    Nursing note and vitals reviewed.  Constitutional: Vital signs are normal. She appears well-developed and well-nourished. She is cooperative.   Elderly female in no apparent distress   HENT:   Head: Normocephalic and atraumatic.   Mouth/Throat: Oropharynx is clear and moist.   Eyes: Conjunctivae, EOM and lids are normal. Pupils are equal, round, and reactive to light.   Neck: Trachea normal. Neck supple.   Normal range of motion.  Cardiovascular:  Normal rate, regular rhythm, normal heart sounds and intact distal pulses.           Pulmonary/Chest: Breath sounds normal.   Abdominal: Abdomen is soft. Bowel sounds are normal.   Musculoskeletal:         General: Tenderness present. No edema. Normal range of motion.      Cervical back: Normal, normal range of motion and neck supple.      Lumbar back: Normal.      Comments: Mild  tenderness in the left shoulder, right hip and right rib area     Neurological: She is alert and oriented to person, place, and time. She has normal strength. Coordination normal. GCS score is 15. GCS eye subscore is 4. GCS verbal subscore is 5. GCS motor subscore is 6.   Skin: Skin is warm, dry and intact. Capillary refill takes less than 2 seconds.   Psychiatric: She has a normal mood and affect. Her speech is normal and behavior is normal. Judgment and thought content normal. Cognition and memory are normal.         ED Course   Procedures  Labs Reviewed - No data to display       Imaging Results              X-Ray Hip 2 or 3 views Right (with Pelvis when performed) (Preliminary result)  Result time 02/11/24 20:04:53      Wet Read by Luis Echeverria MD (02/11/24 20:04:53, [facility], Emergency Medicine)    No fracture, normal alignment                                     XR Ribs Min 3 Views w/PA Chest Right (Preliminary result)  Result time 02/11/24 20:05:22      Wet Read by Luis Echeverria MD (02/11/24 20:05:22, [facility], Emergency Medicine)    No visible fracture, no pneumothorax                                     X-Ray Shoulder 2 or More Views Left (Preliminary result)  Result time 02/11/24 20:06:01      Wet Read by Luis Echeverria MD (02/11/24 20:06:01, [facility], Emergency Medicine)    AC joint separation, which appears chronic, no visible fracture, otherwise normal alignment                                     CT Head Without Contrast (Preliminary result)  Result time 02/11/24 19:36:56      Preliminary result by Norberto Jones MD (02/11/24 19:36:56)                   Narrative:    START OF REPORT:  Technique: CT of the head was performed without intravenous contrast with axial as well as coronal and sagittal images.    Comparison: Comparison is with study dated 2024-01-01 03:04:25.    Dosage Information:  Automated exposure control was utilized.    Clinical history: PT FELL TODAY - PAIN TO FOREHEAD.    Findings:  Hemorrhage: No acute intracranial hemorrhage is seen.  CSF spaces: The ventricles, sulci and basal cisterns all appear moderately prominent consistent with global cerebral atrophy.  Brain parenchyma: There is preservation of the grey white junction throughout. No acute infarct. Moderate stable appearing scattered microvascular change is seen in portions of the periventricular and deep white matter tracts.  Cerebellum: Unremarkable.  Vascular: Unremarkable venous sinuses. Mild to moderate stable appearing scattered atheromatous calcification of the intracranial arteries is seen.  Sella and skull base: The sella appears to be within normal limits for age.  Cerebellopontine angles: Within normal limits.  Herniation: None.  Intracranial calcifications: Incidental note is made of bilateral choroid plexus calcification. Incidental note is made of some pineal region calcification.  Calvarium: No acute linear or depressed skull fracture is seen.    Maxillofacial Structures:  Paranasal sinuses: There is some opacity and mucoperiosteal thickening bilateral maxillary sinuses and bilateral ethmoid air cells. This is consistent with chronic sinusitis. Follow-up as clinically indicated.  Orbits: The orbits appear unremarkable.  Zygomatic arches: The zygomatic arches are intact and unremarkable.  Temporal bones and mastoids: The temporal bones and mastoids appear unremarkable.  TMJ: The mandibular condyles appear normally placed with respect to the mandibular fossa.      Impression:  1. No acute intracranial traumatic injury identified. Details and other findings as noted above.                                         Medications - No data to display  Medical Decision Making  Status post fall  Differential diagnosis:  Intracranial injury, fracture, contusion, strain    Amount and/or Complexity of Data Reviewed  Radiology:  ordered and independent interpretation performed. Decision-making details documented in ED Course.               ED Course as of 02/11/24 2010   [place] Feb 11, 2024 2006 CT Head Without Contrast  Negative for acute injury [TM]      ED Course User Index  [TM] Luis Echeverria MD                           Clinical Impression:  Final diagnoses:  [S20.211A] Contusion of right front wall of thorax, initial encounter (Primary)  [S70.01XA] Contusion of right hip, initial encounter  [S40.012A] Contusion of left shoulder, initial encounter  [S09.90XA] Minor head injury, initial encounter          ED Disposition Condition    Discharge Good          ED Prescriptions    None       Follow-up Information       Follow up With Specialties Details Why Contact Info    Perri Nix NP Family Medicine Call in 1 day  [address] [place] [address] [id number] [phone]               Luis Echeverria MD  02/11/24 2010

## 2024-02-13 ENCOUNTER — LAB REQUISITION (OUTPATIENT)
Dept: LAB | Facility: HOSPITAL | Age: 89
End: 2024-02-13
Attending: INTERNAL MEDICINE
Payer: MEDICARE

## 2024-02-13 DIAGNOSIS — D64.9 ANEMIA, UNSPECIFIED: ICD-10-CM

## 2024-02-16 LAB
ERYTHROCYTE [DISTWIDTH] IN BLOOD BY AUTOMATED COUNT: 16 % (ref 11–14.5)
HCT VFR BLD AUTO: 26.9 % (ref 36–48)
HGB BLD-MCNC: 8.5 G/DL (ref 11.8–16)
IRON SERPL-MCNC: 51 UG/DL (ref 37–170)
MCH RBC QN AUTO: 29 PG (ref 27–34)
MCHC RBC AUTO-ENTMCNC: 31.6 G/DL (ref 31–37)
MCV RBC AUTO: 91.8 FL (ref 79–99)
NRBC BLD AUTO-RTO: 0 %
PLATELET # BLD AUTO: 230 X10(3)/MCL (ref 140–371)
PMV BLD AUTO: 10.2 FL (ref 9.4–12.4)
RBC # BLD AUTO: 2.93 X10(6)/MCL (ref 4–5.1)
WBC # SPEC AUTO: 3.95 X10(3)/MCL (ref 4–11.5)

## 2024-02-16 PROCEDURE — 85027 COMPLETE CBC AUTOMATED: CPT | Performed by: INTERNAL MEDICINE

## 2024-02-16 PROCEDURE — 83540 ASSAY OF IRON: CPT | Performed by: INTERNAL MEDICINE

## 2024-02-28 ENCOUNTER — LAB REQUISITION (OUTPATIENT)
Dept: LAB | Facility: HOSPITAL | Age: 89
End: 2024-02-28
Attending: INTERNAL MEDICINE
Payer: MEDICARE

## 2024-02-28 DIAGNOSIS — E03.9 HYPOTHYROIDISM, UNSPECIFIED: ICD-10-CM

## 2024-02-28 LAB
T4 FREE SERPL-MCNC: 1.65 NG/DL (ref 0.78–2.19)
TSH SERPL-ACNC: 3.56 UIU/ML (ref 0.36–3.74)

## 2024-02-28 PROCEDURE — 84439 ASSAY OF FREE THYROXINE: CPT | Performed by: INTERNAL MEDICINE

## 2024-02-28 PROCEDURE — 84443 ASSAY THYROID STIM HORMONE: CPT | Performed by: INTERNAL MEDICINE

## 2024-04-09 ENCOUNTER — HOSPITAL ENCOUNTER (OUTPATIENT)
Dept: RADIOLOGY | Facility: HOSPITAL | Age: 89
Discharge: HOME | End: 2024-04-09
Attending: INTERNAL MEDICINE
Payer: MEDICARE

## 2024-04-09 DIAGNOSIS — R92.8 ABNORMAL MAMMOGRAM: ICD-10-CM

## 2024-04-09 PROCEDURE — 77066 DX MAMMO INCL CAD BI: CPT | Mod: TC

## 2024-04-09 PROCEDURE — 77062 BREAST TOMOSYNTHESIS BI: CPT | Mod: 26,,, | Performed by: STUDENT IN AN ORGANIZED HEALTH CARE EDUCATION/TRAINING PROGRAM

## 2024-04-09 PROCEDURE — 77066 DX MAMMO INCL CAD BI: CPT | Mod: 26,,, | Performed by: STUDENT IN AN ORGANIZED HEALTH CARE EDUCATION/TRAINING PROGRAM

## 2024-04-27 ENCOUNTER — LAB REQUISITION (OUTPATIENT)
Dept: LAB | Facility: HOSPITAL | Age: 89
End: 2024-04-27
Payer: MEDICARE

## 2024-04-27 DIAGNOSIS — N39.0 URINARY TRACT INFECTION, SITE NOT SPECIFIED: ICD-10-CM

## 2024-04-27 LAB
APPEARANCE UR: CLEAR
BILIRUB UR QL STRIP.AUTO: NEGATIVE
COLOR UR AUTO: YELLOW
GLUCOSE UR QL STRIP.AUTO: NEGATIVE
KETONES UR QL STRIP.AUTO: NEGATIVE
LEUKOCYTE ESTERASE UR QL STRIP.AUTO: NEGATIVE
NITRITE UR QL STRIP.AUTO: NEGATIVE
PH UR STRIP.AUTO: 5.5 [PH]
PROT UR QL STRIP.AUTO: NEGATIVE
RBC UR QL AUTO: NEGATIVE
SP GR UR STRIP.AUTO: 1.01 (ref 1–1.03)
UROBILINOGEN UR STRIP-ACNC: 0.2

## 2024-04-27 PROCEDURE — 81003 URINALYSIS AUTO W/O SCOPE: CPT | Performed by: INTERNAL MEDICINE

## 2024-05-19 NOTE — ANESTHESIA PROCEDURE NOTES
Monitor MALU    Diagnosis: Atrial Fibrillation  Patient location during procedure: OR  Surgery related to: Amulet Device Implantation    Staffing  Authorizing Provider: Ely Benjamin MD  Performing Provider: Ely Benjamin MD    Staffing  Anesthesiologist Present  Yes  Setup & Induction  Patient preparation: bite block inserted  Probe Insertion: easy      Findings    After induction of general endotracheal anesthesia in the operating room, transesophageal echo probe was placed atraumatically and the esophagus.      Multiple views were obtained for assessment of left atrial appendage at 0, 45, 90° and 135°.  Left ventricular ejection fraction is 35% with global moderate hypokinesis.  The interatrial septum is widely mobile and bowing slightly to the right.  No interatrial septal defect is identified preoperatively.    Echo guidance was used to cross the interatrial septum, place a pigtail for angiogram of the left atrial appendage, and placement of an ambulatory device with color-flow Doppler used to assess complete occlusion of the left atrial appendage with the device, tug test was performed which was negative.  Multiple views were again obtained with color-flow Doppler as well as three-dimensional imaging to confirm adequate placement of the device and no Doppler flow within the left atrial appendage or around the device.    At the conclusion of the operation the transesophageal echo probe was removed atraumatically and the patient brought uneventfull to the postanesthesia care unit  Impression    Other Findings    Probe Removal     MALU probe removed without event.         Cholo

## 2024-06-05 ENCOUNTER — LAB REQUISITION (OUTPATIENT)
Dept: LAB | Facility: HOSPITAL | Age: 89
End: 2024-06-05
Payer: MEDICARE

## 2024-06-05 DIAGNOSIS — N39.0 URINARY TRACT INFECTION, SITE NOT SPECIFIED: ICD-10-CM

## 2024-06-05 LAB
BILIRUB UR QL STRIP.AUTO: NEGATIVE
CLARITY UR: CLEAR
COLOR UR AUTO: YELLOW
GLUCOSE UR QL STRIP: NEGATIVE
HGB UR QL STRIP: NEGATIVE
KETONES UR QL STRIP: NEGATIVE
LEUKOCYTE ESTERASE UR QL STRIP: NEGATIVE
NITRITE UR QL STRIP: NEGATIVE
PH UR STRIP: 6 [PH]
PROT UR QL STRIP: NEGATIVE
SP GR UR STRIP.AUTO: 1.01 (ref 1–1.03)
UROBILINOGEN UR STRIP-ACNC: 0.2

## 2024-06-05 PROCEDURE — 87086 URINE CULTURE/COLONY COUNT: CPT | Performed by: INTERNAL MEDICINE

## 2024-06-05 PROCEDURE — 81003 URINALYSIS AUTO W/O SCOPE: CPT | Performed by: INTERNAL MEDICINE

## 2024-06-07 LAB — BACTERIA UR CULT: NO GROWTH

## 2024-07-03 ENCOUNTER — LAB REQUISITION (OUTPATIENT)
Dept: LAB | Facility: HOSPITAL | Age: 89
End: 2024-07-03
Payer: MEDICARE

## 2024-07-03 DIAGNOSIS — E03.9 HYPOTHYROIDISM, UNSPECIFIED: ICD-10-CM

## 2024-07-03 DIAGNOSIS — D64.9 ANEMIA, UNSPECIFIED: ICD-10-CM

## 2024-07-03 DIAGNOSIS — I10 ESSENTIAL (PRIMARY) HYPERTENSION: ICD-10-CM

## 2024-07-03 LAB
ANION GAP SERPL CALC-SCNC: 6 MEQ/L (ref 2–13)
BUN SERPL-MCNC: 32 MG/DL (ref 7–20)
CALCIUM SERPL-MCNC: 8.6 MG/DL (ref 8.4–10.2)
CHLORIDE SERPL-SCNC: 109 MMOL/L (ref 98–110)
CO2 SERPL-SCNC: 23 MMOL/L (ref 21–32)
CREAT SERPL-MCNC: 1.1 MG/DL (ref 0.66–1.25)
CREAT/UREA NIT SERPL: 29 (ref 12–20)
ERYTHROCYTE [DISTWIDTH] IN BLOOD BY AUTOMATED COUNT: 13.6 % (ref 11–14.5)
GFR SERPLBLD CREATININE-BSD FMLA CKD-EPI: 48 ML/MIN/1.73/M2
GLUCOSE SERPL-MCNC: 99 MG/DL (ref 70–115)
HCT VFR BLD AUTO: 26.5 % (ref 36–48)
HGB BLD-MCNC: 8.7 G/DL (ref 11.8–16)
MCH RBC QN AUTO: 31.4 PG (ref 27–34)
MCHC RBC AUTO-ENTMCNC: 32.8 G/DL (ref 31–37)
MCV RBC AUTO: 95.7 FL (ref 79–99)
NRBC BLD AUTO-RTO: 0 %
PLATELET # BLD AUTO: 177 X10(3)/MCL (ref 140–371)
PMV BLD AUTO: 10.6 FL (ref 9.4–12.4)
POTASSIUM SERPL-SCNC: 4.6 MMOL/L (ref 3.5–5.1)
RBC # BLD AUTO: 2.77 X10(6)/MCL (ref 4–5.1)
SODIUM SERPL-SCNC: 138 MMOL/L (ref 136–145)
T4 SERPL-MCNC: 8.38 UG/DL (ref 5.53–11)
TSH SERPL-ACNC: 3.24 UIU/ML (ref 0.36–3.74)
WBC # BLD AUTO: 4.65 X10(3)/MCL (ref 4–11.5)

## 2024-07-03 PROCEDURE — 84436 ASSAY OF TOTAL THYROXINE: CPT | Performed by: INTERNAL MEDICINE

## 2024-07-03 PROCEDURE — 85027 COMPLETE CBC AUTOMATED: CPT | Performed by: INTERNAL MEDICINE

## 2024-07-03 PROCEDURE — 80048 BASIC METABOLIC PNL TOTAL CA: CPT | Performed by: INTERNAL MEDICINE

## 2024-07-03 PROCEDURE — 84443 ASSAY THYROID STIM HORMONE: CPT | Performed by: INTERNAL MEDICINE

## 2024-11-27 ENCOUNTER — HOSPITAL ENCOUNTER (EMERGENCY)
Facility: HOSPITAL | Age: 89
Discharge: HOME | End: 2024-11-27
Attending: INTERNAL MEDICINE
Payer: MEDICARE

## 2024-11-27 VITALS
HEART RATE: 71 BPM | OXYGEN SATURATION: 98 % | DIASTOLIC BLOOD PRESSURE: 89 MMHG | WEIGHT: 106 LBS | HEIGHT: 60 IN | SYSTOLIC BLOOD PRESSURE: 139 MMHG | RESPIRATION RATE: 16 BRPM | TEMPERATURE: 98 F | BODY MASS INDEX: 20.81 KG/M2
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
## 2024-11-27 DIAGNOSIS — S00.03XA CONTUSION OF SCALP, INITIAL ENCOUNTER: Primary | ICD-10-CM

## 2024-11-27 PROCEDURE — 63600175 PHARM REV CODE 636 W HCPCS: Performed by: INTERNAL MEDICINE

## 2024-11-27 PROCEDURE — 96375 TX/PRO/DX INJ NEW DRUG ADDON: CPT

## 2024-11-27 PROCEDURE — 99285 EMERGENCY DEPT VISIT HI MDM: CPT | Mod: 25

## 2024-11-27 PROCEDURE — 96374 THER/PROPH/DIAG INJ IV PUSH: CPT

## 2024-11-27 RX ORDER — HYDRALAZINE HYDROCHLORIDE 20 MG/ML
10 INJECTION INTRAMUSCULAR; INTRAVENOUS
Status: COMPLETED | OUTPATIENT
Start: 2024-11-27 | End: 2024-11-27

## 2024-11-27 RX ORDER — TRAMADOL HYDROCHLORIDE 50 MG/1
50 TABLET ORAL EVERY 6 HOURS PRN
Qty: 12 TABLET | Refills: 0 | Status: SHIPPED | OUTPATIENT
Start: 2024-11-27

## 2024-11-27 RX ORDER — ONDANSETRON HYDROCHLORIDE 2 MG/ML
4 INJECTION, SOLUTION INTRAVENOUS
Status: COMPLETED | OUTPATIENT
Start: 2024-11-27 | End: 2024-11-27

## 2024-11-27 RX ADMIN — ONDANSETRON 4 MG: 2 INJECTION INTRAMUSCULAR; INTRAVENOUS at 04:11

## 2024-11-27 RX ADMIN — HYDRALAZINE HYDROCHLORIDE 10 MG: 20 INJECTION INTRAMUSCULAR; INTRAVENOUS at 04:11

## 2024-11-27 NOTE — ED PROVIDER NOTES
11/27/2024         3:58 PM    Source of History:  History obtained from patient and EMS.     Chief complaint:  From Nurse Triage:  Fall (PT fell between toilet and wall at nursing home and hit head.  Has small hematoma to back of head and c/o rt hip pain.)    HISTORY OF PRESENT ILLNES:  Katie Murphy is a 89 y.o. female  has a past medical history of Anxiety, Arthritis, Ascending aorta dilatation, Asthma, Atrial fibrillation, Breast cancer, CAD (coronary artery disease), Clotting disorder, JONES (dyspnea on exertion), DVT (deep venous thrombosis), Essential (primary) hypertension, GERD (gastroesophageal reflux disease), Hypoglycemia, Hypothyroidism, unspecified, Insomnia, Migraines, Mixed hyperlipidemia, Myasthenia gravis without (acute) exacerbation, Pacemaker, PAF (paroxysmal atrial fibrillation), and TIA (transient ischemic attack). presenting with Fall (PT fell between toilet and wall at nursing home and hit head.  Has small hematoma to back of head and c/o rt hip pain.)  Patient states she was sitting on a toilet and got up to pull her panties a when she slipped and fell on her right side.  No loss of consciousness patient complains of pain to mid to distal right femur and right occipital scalp.  No loss of consciousness no focal weakness.      REVIEW OF SYSTEMS:   Constitutional symptoms:     Skin symptoms:      Eye symptoms:     ENMT symptoms:      Respiratory symptoms:      Cardiovascular symptoms:     Gastrointestinal symptoms:      Genitourinary symptoms:     Musculoskeletal symptoms:      Neurologic symptoms:      Psychiatric symptoms:               Additional review of systems information: Patient Denies Any Other Complaints.    All Other Systems Reviewed With Patient And Negative.    ALLEGIES:  Review of patient's allergies indicates:   Allergen Reactions    Sulfa (sulfonamide antibiotics) Swelling     mouth    Acetaminophen Nausea And Vomiting     Facial swelling        MEDICINE LIST:  Current  Outpatient Medications   Medication Instructions    aspirin (ECOTRIN) 81 mg, Oral, Daily    clopidogreL (PLAVIX) 75 mg, Oral, Daily    dimenhyDRINATE (DRAMAMINE) 50 MG tablet 1 tablet, Daily PRN    famotidine (PEPCID) 40 mg, Daily    gabapentin (NEURONTIN) 300 mg, 3 times daily    isosorbide mononitrate (ISMO,MONOKET) 10 mg, 2 times daily    levothyroxine (SYNTHROID) 112 mcg, Every morning    metoprolol succinate (TOPROL-XL) 50 mg, Daily    nitroGLYCERIN (NITROSTAT) 0.4 MG SL tablet 1 tablet, Daily PRN    olmesartan (BENICAR) 20 mg, Daily    pravastatin (PRAVACHOL) 20 mg, Nightly    ranolazine (RANEXA) 500 mg, 2 times daily    traZODone (DESYREL) 50 mg, Nightly        PMH:  As per HPI and below:    Reviewed and updated in chart.    PAST MEDICAL HISTORY:  Past Medical History:   Diagnosis Date    Anxiety     Arthritis     Ascending aorta dilatation     Asthma     Atrial fibrillation     Breast cancer     CAD (coronary artery disease)     Clotting disorder     JONES (dyspnea on exertion)     DVT (deep venous thrombosis)     Essential (primary) hypertension     GERD (gastroesophageal reflux disease)     Hypoglycemia     Hypothyroidism, unspecified     Insomnia     Migraines     Mixed hyperlipidemia     Myasthenia gravis without (acute) exacerbation     Pacemaker     PAF (paroxysmal atrial fibrillation)     TIA (transient ischemic attack)         PAST SURGICAL HISTORY:  Past Surgical History:   Procedure Laterality Date    APPENDECTOMY      BREAST LUMPECTOMY      CATARACT EXTRACTION      CHOLECYSTECTOMY      CLOSURE OF LEFT ATRIAL APPENDAGE USING DEVICE N/A 4/5/2023    Procedure: Left atrial appendage closure device;  Surgeon: Robert Winston MD;  Location: [facility] CATH LAB;  Service: Cardiology;  Laterality: N/A;  WATCHMAN W/ INTRA OP MALU    COLONOSCOPY      CORONARY STENT PLACEMENT  2018    ECHOCARDIOGRAM,TRANSESOPHAGEAL N/A 4/5/2023    Procedure: Transesophageal echo (MALU) intra-procedure log documentation;   Surgeon: [facility] Diagnostic Provider;  Location: [facility] CATH LAB;  Service: Cardiology;  Laterality: N/A;    INSERTION OF PACEMAKER      PARTIAL HYSTERECTOMY         SOCIAL HISTORY:  Social History     Tobacco Use    Smoking status: Never    Smokeless tobacco: Never   Substance Use Topics    Alcohol use: Never    Drug use: Never       FAMILY HISTORY:  Family History   Problem Relation Name Age of Onset    Heart disease Father          PROBLEM LIST:  Patient Active Problem List   Diagnosis    Paroxysmal atrial fibrillation        PHYSICAL EXAM:      ED Triage Vitals   BP 11/27/24 1540 (!) 167/108   Pulse 11/27/24 1537 67   Resp 11/27/24 1537 20   Temp 11/27/24 1540 98.1 °F (36.7 °C)   SpO2 11/27/24 1537 96 %        Vital Signs: Reviewed As In Chart.  General:  Alert, No Cardiorespiratory Distress Noted.  Head: Normocephalic small hematoma to right occipital area  Eye:  Pupils equal and reactive to light and accomodation. Extraocular Movements Are Intact.   ENT: Mucus membranes are moist.   Neck: Supple  Cardiovascular:  Regular Rate And Rhythm     Respiratory:  Clear to auscultation bilaterally    Gastrointestinal:  Soft, Non Distended, Non Tenderness, Normal Bowel Sounds.    Neurological:  Alert And Oriented To Person, Place, Time, And Situation, Normal Motor Observed, Normal Speech Observed.  Back: Normal range of motion  Musculoskeletal:  No Gross Deformity Noted.  Tenderness to distal 3rd of the right thigh.  No deformity noted  Genital: deferred    Psychiatric:  Cooperative.  Skin: warm, dry  Lymphatic: No lymphadenopathy      ED WORKUP FOR MEDICAL DECISION MAKING:    ED ORDERS:  Orders Placed This Encounter   Procedures    X-Ray Hip 2 or 3 views Right with Pelvis when performed    CT Head Without Contrast    X-Ray Femur 2 AP/LAT Right    Insert Saline lock IV       ED MEDICINES:  Medications   ondansetron injection 4 mg (4 mg Intravenous Given 11/27/24 1610)   hydrALAZINE injection 10 mg (10 mg Intravenous Given  11/27/24 1610)                ED LABS ORDERED AND REVIEWED:  No visits with results within 1 Day(s) from this visit.   Latest known visit with results is:   Lab Requisition on 07/03/2024   Component Date Value Ref Range Status    WBC 07/03/2024 4.65  4.00 - 11.50 x10(3)/mcL Final    RBC 07/03/2024 2.77 (L)  4.00 - 5.10 x10(6)/mcL Final    Hgb 07/03/2024 8.7 (L)  11.8 - 16.0 g/dL Final    Hct 07/03/2024 26.5 (L)  36.0 - 48.0 % Final    MCV 07/03/2024 95.7  79.0 - 99.0 fL Final    MCH 07/03/2024 31.4  27.0 - 34.0 pg Final    MCHC 07/03/2024 32.8  31.0 - 37.0 g/dL Final    RDW 07/03/2024 13.6  11.0 - 14.5 % Final    Platelet 07/03/2024 177  140 - 371 x10(3)/mcL Final    MPV 07/03/2024 10.6  9.4 - 12.4 fL Final    NRBC% 07/03/2024 0.0  <=1 % Final    Sodium 07/03/2024 138  136 - 145 mmol/L Final    Potassium 07/03/2024 4.6  3.5 - 5.1 mmol/L Final    Chloride 07/03/2024 109  98 - 110 mmol/L Final    CO2 07/03/2024 23  21 - 32 mmol/L Final    Glucose 07/03/2024 99  70 - 115 mg/dL Final    Blood Urea Nitrogen 07/03/2024 32 (H)  7.0 - 20.0 mg/dL Final    Creatinine 07/03/2024 1.10  0.66 - 1.25 mg/dL Final    BUN/Creatinine Ratio 07/03/2024 29 (H)  12 - 20 Final    Calcium 07/03/2024 8.6  8.4 - 10.2 mg/dL Final    Anion Gap 07/03/2024 6.0  2.0 - 13.0 mEq/L Final    eGFR 07/03/2024 48  mL/min/1.73/m2 Final    TSH 07/03/2024 3.240  0.360 - 3.740 uIU/mL Final    Thyroxine 07/03/2024 8.38  5.53 - 11.00 ug/dL Final       RADIOLOGY STUDIES ORDERED AND REVIEWED:  Imaging Results              CT Head Without Contrast (Final result)  Result time 11/27/24 16:27:12      Final result by Augusto Rosenthal MD (11/27/24 16:27:12)                   Impression:      1. No acute intracranial abnormality identified  2. Generalized cerebral and cerebellar atrophy  3. Findings and other details as above      Electronically signed by: Augusto Rosenthal  Date:    11/27/2024  Time:    16:27               Narrative:    EXAMINATION:  CT HEAD WITHOUT  CONTRAST    CLINICAL HISTORY:  Head trauma, minor (Age >= 65y);, .    TECHNIQUE:  PATIENT RADIATION DOSE: DLP(mGycm) 866    As per PQRS measures, all CT scans at this facility used dose modulation, iterative reconstruction, and/or weight based dose adjustment when appropriate to reduce radiation dose to as low as reasonably achievable.    COMPARISON:  02/11/2024    FINDINGS:  Serial axial images were obtained of the head without the administration of IV contrast. Both brain and bone parenchymal windows were obtained.  Additional coronal and sagittal reconstructions were obtained.  Ventricles, cisterns, and sulci are prominent in size.  There is no evidence of intracranial hemorrhage, midline shift, mass effect, or abnormal extra-axial fluid collections.  Scattered hypodensities are seen within the periventricular white matter suspicious for small vessel ischemic changes.  Intracranial atherosclerosis seen.  Cerebellar tonsils extend caudally to the level of foramen magnum.  There is beam hardening artifact at the skull base.  Visualized is paranasal sinuses and mastoid air cells are relatively clear.  A few small filling defects noted to the external auditory canals bilaterally suggesting cerumen.  Clinical correlation is indicated.  No acute calvarial fracture is identified.  Bony structures are mildly osteopenic.                                       X-Ray Hip 2 or 3 views Right with Pelvis when performed (Final result)  Result time 11/27/24 16:17:49      Final result by Augusto Rosenthal MD (11/27/24 16:17:49)                   Impression:      1. No acute osseous defect identified  2. Osteoarthritis  3. Osteopenia      Electronically signed by: Augusto Rosenthal  Date:    11/27/2024  Time:    16:17               Narrative:    EXAMINATION:  XR HIP WITH PELVIS WHEN PERFORMED 2 OR 3 VIEWS RIGHT    CLINICAL HISTORY:  Unspecified fall, initial encounter; .    COMPARISON:  02/11/2024    FINDINGS:  AP and frogleg lateral  views revealno definite fracture or dislocation.  Bony structures are osteopenic.  No aggressive osteolytic or osteoblastic lesion is seen.  Obturator foramen and iliac bones are mildly asymmetric in appearance; likely due to patient positioning/rotation.  There is limited change between the AP and frogleg lateral views.                                       X-Ray Femur 2 AP/LAT Right (Final result)  Result time 11/27/24 16:45:20      Final result by Augusto Rosenthal MD (11/27/24 16:45:20)                   Impression:      1. No acute osseous defect identified  2. Osteoarthritis  3. Osteopenia      Electronically signed by: Augusto Rosenthal  Date:    11/27/2024  Time:    16:45               Narrative:    EXAMINATION:  XR FEMUR 2 VIEW RIGHT    CLINICAL HISTORY:  Unspecified fall, initial encounter; .    COMPARISON:  None available.    FINDINGS:  AP and lateral views reveal no definite fracture or dislocation.  There is narrowing of the right hip joint.  Bony structures are osteopenic.  There is narrowing of the medial compartment space of the knee.  No aggressive osteolytic or osteoblastic lesions seen.  The lateral view of the distal femur somewhat limited secondary to suboptimal positioning.                                      MEDICAL DECISION MAKING:    Katie Murphy is 89 y.o. female who  has a past medical history of Anxiety, Arthritis, Ascending aorta dilatation, Asthma, Atrial fibrillation, Breast cancer, CAD (coronary artery disease), Clotting disorder, JONES (dyspnea on exertion), DVT (deep venous thrombosis), Essential (primary) hypertension, GERD (gastroesophageal reflux disease), Hypoglycemia, Hypothyroidism, unspecified, Insomnia, Migraines, Mixed hyperlipidemia, Myasthenia gravis without (acute) exacerbation, Pacemaker, PAF (paroxysmal atrial fibrillation), and TIA (transient ischemic attack). arrives in ER with c/o Fall (PT fell between toilet and wall at nursing home and hit head.  Has small hematoma  to back of head and c/o rt hip pain.)      Reviewed Nurses Note. Reviewed Vital Signs.     Reviewed Pertinent old records, History and updated as necessary.    Vitals:    11/27/24 1641   BP: (!) 141/76   Pulse: 70   Resp:    Temp:         Medical Decision Making  Differential diagnosis includes but is not limited to contusion, fracture, closed head injury    Amount and/or Complexity of Data Reviewed  Radiology: ordered.    Risk  Prescription drug management.                        PROCEDURES PERFORMED IN ED:  Procedures    DIAGNOSTIC IMPRESSION:        ICD-10-CM ICD-9-CM   1. Contusion of scalp, initial encounter  S00.03XA 920   2. Fall  W19.XXXA E888.9         ED Disposition Condition    Discharge Stable               Medication List        ASK your doctor about these medications      aspirin 81 MG EC tablet  Commonly known as: ECOTRIN  Take 1 tablet (81 mg total) by mouth once daily.     clopidogreL 75 mg tablet  Commonly known as: PLAVIX  Take 1 tablet (75 mg total) by mouth once daily.     dimenhyDRINATE 50 MG tablet  Commonly known as: DRAMAMINE     famotidine 40 MG tablet  Commonly known as: PEPCID     gabapentin 300 MG capsule  Commonly known as: NEURONTIN     isosorbide mononitrate 10 mg tablet  Commonly known as: ISMO,MONOKET     levothyroxine 112 MCG tablet  Commonly known as: SYNTHROID     metoprolol succinate 50 MG 24 hr tablet  Commonly known as: TOPROL-XL     nitroGLYCERIN 0.4 MG SL tablet  Commonly known as: NITROSTAT     olmesartan 20 MG tablet  Commonly known as: BENICAR     pravastatin 20 MG tablet  Commonly known as: PRAVACHOL     ranolazine 500 MG Tb12  Commonly known as: RANEXA     traZODone 50 MG tablet  Commonly known as: DESYREL                Follow-up Information       Primary care physician In 2 days.                              ED Prescriptions    None       Follow-up Information       Follow up With Specialties Details Why Contact Info    Primary care physician  In 2 days                 Dayne Sheehan MD  11/27/24 [id number]

## 2025-01-02 ENCOUNTER — LAB REQUISITION (OUTPATIENT)
Dept: LAB | Facility: HOSPITAL | Age: OVER 89
End: 2025-01-02
Attending: INTERNAL MEDICINE
Payer: MEDICARE

## 2025-01-02 DIAGNOSIS — E03.9 HYPOTHYROIDISM, UNSPECIFIED: ICD-10-CM

## 2025-01-02 DIAGNOSIS — E53.9 VITAMIN B DEFICIENCY, UNSPECIFIED: ICD-10-CM

## 2025-01-02 DIAGNOSIS — I10 ESSENTIAL (PRIMARY) HYPERTENSION: ICD-10-CM

## 2025-01-02 DIAGNOSIS — D64.9 ANEMIA, UNSPECIFIED: ICD-10-CM

## 2025-01-03 LAB
ALBUMIN SERPL-MCNC: 2.8 G/DL (ref 3.4–5)
ALBUMIN/GLOB SERPL: 1.2 RATIO
ALP SERPL-CCNC: 71 UNIT/L (ref 50–144)
ALT SERPL-CCNC: 15 UNIT/L (ref 1–45)
ANION GAP SERPL CALC-SCNC: 2 MEQ/L (ref 2–13)
AST SERPL-CCNC: 23 UNIT/L (ref 14–36)
BASOPHILS # BLD AUTO: 0.03 X10(3)/MCL (ref 0.01–0.08)
BASOPHILS NFR BLD AUTO: 1 % (ref 0.1–1.2)
BILIRUB SERPL-MCNC: 0.3 MG/DL (ref 0–1)
BUN SERPL-MCNC: 25 MG/DL (ref 7–20)
CALCIUM SERPL-MCNC: 8.6 MG/DL (ref 8.4–10.2)
CHLORIDE SERPL-SCNC: 110 MMOL/L (ref 98–110)
CO2 SERPL-SCNC: 24 MMOL/L (ref 21–32)
CREAT SERPL-MCNC: 1.04 MG/DL (ref 0.66–1.25)
CREAT/UREA NIT SERPL: 24 (ref 12–20)
EOSINOPHIL # BLD AUTO: 0.1 X10(3)/MCL (ref 0.04–0.36)
EOSINOPHIL NFR BLD AUTO: 3.4 % (ref 0.7–7)
ERYTHROCYTE [DISTWIDTH] IN BLOOD BY AUTOMATED COUNT: 13.2 % (ref 11–14.5)
GFR SERPLBLD CREATININE-BSD FMLA CKD-EPI: 51 ML/MIN/1.73/M2
GLOBULIN SER-MCNC: 2.4 GM/DL (ref 2–3.9)
GLUCOSE SERPL-MCNC: 104 MG/DL (ref 70–115)
HCT VFR BLD AUTO: 27.4 % (ref 36–48)
HGB BLD-MCNC: 8.7 G/DL (ref 11.8–16)
IMM GRANULOCYTES # BLD AUTO: 0.02 X10(3)/MCL (ref 0–0.03)
IMM GRANULOCYTES NFR BLD AUTO: 0.7 % (ref 0–0.5)
LYMPHOCYTES # BLD AUTO: 0.82 X10(3)/MCL (ref 1.16–3.74)
LYMPHOCYTES NFR BLD AUTO: 28.3 % (ref 20–55)
MCH RBC QN AUTO: 31.1 PG (ref 27–34)
MCHC RBC AUTO-ENTMCNC: 31.8 G/DL (ref 31–37)
MCV RBC AUTO: 97.9 FL (ref 79–99)
MONOCYTES # BLD AUTO: 0.53 X10(3)/MCL (ref 0.24–0.36)
MONOCYTES NFR BLD AUTO: 18.3 % (ref 4.7–12.5)
NEUTROPHILS # BLD AUTO: 1.4 X10(3)/MCL (ref 1.56–6.13)
NEUTROPHILS NFR BLD AUTO: 48.3 % (ref 37–73)
PLATELET # BLD AUTO: 223 X10(3)/MCL (ref 140–371)
PLATELET # BLD EST: ADEQUATE 10*3/UL
PMV BLD AUTO: 10.8 FL (ref 9.4–12.4)
POTASSIUM SERPL-SCNC: 4.9 MMOL/L (ref 3.5–5.1)
PROT SERPL-MCNC: 5.2 GM/DL (ref 6.3–8.2)
RBC # BLD AUTO: 2.8 X10(6)/MCL (ref 4–5.1)
RBC MORPH BLD: NORMAL
SODIUM SERPL-SCNC: 136 MMOL/L (ref 136–145)
T4 SERPL-MCNC: 7.74 UG/DL (ref 5.53–11)
TSH SERPL-ACNC: 5.67 UIU/ML (ref 0.36–3.74)
VIT B12 SERPL-MCNC: 873 PG/ML (ref 211–946)
WBC # BLD AUTO: 2.9 X10(3)/MCL (ref 4–11.5)

## 2025-01-03 PROCEDURE — 84443 ASSAY THYROID STIM HORMONE: CPT | Performed by: INTERNAL MEDICINE

## 2025-01-03 PROCEDURE — 85025 COMPLETE CBC W/AUTO DIFF WBC: CPT | Performed by: INTERNAL MEDICINE

## 2025-01-03 PROCEDURE — 84436 ASSAY OF TOTAL THYROXINE: CPT | Performed by: INTERNAL MEDICINE

## 2025-01-03 PROCEDURE — 80053 COMPREHEN METABOLIC PANEL: CPT | Performed by: INTERNAL MEDICINE

## 2025-01-03 PROCEDURE — 82607 VITAMIN B-12: CPT | Performed by: INTERNAL MEDICINE

## 2025-01-14 ENCOUNTER — LAB REQUISITION (OUTPATIENT)
Dept: LAB | Facility: HOSPITAL | Age: OVER 89
End: 2025-01-14
Payer: MEDICARE

## 2025-01-14 DIAGNOSIS — D64.9 ANEMIA, UNSPECIFIED: ICD-10-CM

## 2025-01-15 LAB
BASOPHILS # BLD AUTO: 0.02 X10(3)/MCL (ref 0.01–0.08)
BASOPHILS NFR BLD AUTO: 0.4 % (ref 0.1–1.2)
EOSINOPHIL # BLD AUTO: 0.1 X10(3)/MCL (ref 0.04–0.36)
EOSINOPHIL NFR BLD AUTO: 2.1 % (ref 0.7–7)
ERYTHROCYTE [DISTWIDTH] IN BLOOD BY AUTOMATED COUNT: 13.2 % (ref 11–14.5)
HCT VFR BLD AUTO: 28.2 % (ref 36–48)
HGB BLD-MCNC: 9 G/DL (ref 11.8–16)
IMM GRANULOCYTES # BLD AUTO: 0.03 X10(3)/MCL (ref 0–0.03)
IMM GRANULOCYTES NFR BLD AUTO: 0.6 % (ref 0–0.5)
LYMPHOCYTES # BLD AUTO: 0.71 X10(3)/MCL (ref 1.16–3.74)
LYMPHOCYTES NFR BLD AUTO: 15 % (ref 20–55)
MCH RBC QN AUTO: 30.7 PG (ref 27–34)
MCHC RBC AUTO-ENTMCNC: 31.9 G/DL (ref 31–37)
MCV RBC AUTO: 96.2 FL (ref 79–99)
MONOCYTES # BLD AUTO: 0.6 X10(3)/MCL (ref 0.24–0.36)
MONOCYTES NFR BLD AUTO: 12.7 % (ref 4.7–12.5)
NEUTROPHILS # BLD AUTO: 3.27 X10(3)/MCL (ref 1.56–6.13)
NEUTROPHILS NFR BLD AUTO: 69.2 % (ref 37–73)
NRBC BLD AUTO-RTO: 0 %
PLATELET # BLD AUTO: 233 X10(3)/MCL (ref 140–371)
PMV BLD AUTO: 10.2 FL (ref 9.4–12.4)
RBC # BLD AUTO: 2.93 X10(6)/MCL (ref 4–5.1)
WBC # BLD AUTO: 4.73 X10(3)/MCL (ref 4–11.5)

## 2025-01-15 PROCEDURE — 85025 COMPLETE CBC W/AUTO DIFF WBC: CPT | Performed by: INTERNAL MEDICINE

## 2025-02-12 ENCOUNTER — LAB REQUISITION (OUTPATIENT)
Dept: LAB | Facility: HOSPITAL | Age: OVER 89
End: 2025-02-12
Payer: MEDICARE

## 2025-02-12 DIAGNOSIS — E03.9 HYPOTHYROIDISM, UNSPECIFIED: ICD-10-CM

## 2025-02-12 LAB
T4 FREE SERPL-MCNC: 1.25 NG/DL (ref 0.78–2.19)
TSH SERPL-ACNC: 2.32 UIU/ML (ref 0.36–3.74)

## 2025-02-12 PROCEDURE — 84439 ASSAY OF FREE THYROXINE: CPT | Performed by: INTERNAL MEDICINE

## 2025-02-12 PROCEDURE — 84443 ASSAY THYROID STIM HORMONE: CPT | Performed by: INTERNAL MEDICINE

## 2025-02-25 ENCOUNTER — LAB REQUISITION (OUTPATIENT)
Dept: LAB | Facility: HOSPITAL | Age: OVER 89
End: 2025-02-25
Attending: INTERNAL MEDICINE
Payer: MEDICARE

## 2025-02-25 DIAGNOSIS — D64.9 ANEMIA, UNSPECIFIED: ICD-10-CM

## 2025-02-26 LAB
BASOPHILS # BLD AUTO: 0.03 X10(3)/MCL (ref 0.01–0.08)
BASOPHILS NFR BLD AUTO: 0.6 % (ref 0.1–1.2)
EOSINOPHIL # BLD AUTO: 0.08 X10(3)/MCL (ref 0.04–0.36)
EOSINOPHIL NFR BLD AUTO: 1.6 % (ref 0.7–7)
ERYTHROCYTE [DISTWIDTH] IN BLOOD BY AUTOMATED COUNT: 14.2 % (ref 11–14.5)
HCT VFR BLD AUTO: 27.5 % (ref 36–48)
HGB BLD-MCNC: 9.1 G/DL (ref 11.8–16)
IMM GRANULOCYTES # BLD AUTO: 0.03 X10(3)/MCL (ref 0–0.03)
IMM GRANULOCYTES NFR BLD AUTO: 0.6 % (ref 0–0.5)
LYMPHOCYTES # BLD AUTO: 1.12 X10(3)/MCL (ref 1.16–3.74)
LYMPHOCYTES NFR BLD AUTO: 22.8 % (ref 20–55)
MCH RBC QN AUTO: 30.5 PG (ref 27–34)
MCHC RBC AUTO-ENTMCNC: 33.1 G/DL (ref 31–37)
MCV RBC AUTO: 92.3 FL (ref 79–99)
MONOCYTES # BLD AUTO: 0.75 X10(3)/MCL (ref 0.24–0.36)
MONOCYTES NFR BLD AUTO: 15.2 % (ref 4.7–12.5)
NEUTROPHILS # BLD AUTO: 2.91 X10(3)/MCL (ref 1.56–6.13)
NEUTROPHILS NFR BLD AUTO: 59.2 % (ref 37–73)
NRBC BLD AUTO-RTO: 0 %
PLATELET # BLD AUTO: 217 X10(3)/MCL (ref 140–371)
PMV BLD AUTO: 10.2 FL (ref 9.4–12.4)
RBC # BLD AUTO: 2.98 X10(6)/MCL (ref 4–5.1)
RET# (OHS): 0.06 X10E6/UL (ref 0.02–0.08)
RETICULOCYTE COUNT AUTOMATED (OLG): 2.09 % (ref 1.1–2.1)
WBC # BLD AUTO: 4.92 X10(3)/MCL (ref 4–11.5)

## 2025-02-26 PROCEDURE — 85025 COMPLETE CBC W/AUTO DIFF WBC: CPT | Performed by: INTERNAL MEDICINE

## 2025-02-26 PROCEDURE — 85045 AUTOMATED RETICULOCYTE COUNT: CPT | Performed by: INTERNAL MEDICINE

## 2025-02-27 ENCOUNTER — LAB REQUISITION (OUTPATIENT)
Dept: LAB | Facility: HOSPITAL | Age: OVER 89
End: 2025-02-27
Payer: MEDICARE

## 2025-02-27 DIAGNOSIS — D64.9 ANEMIA, UNSPECIFIED: ICD-10-CM

## 2025-02-27 LAB
FERRITIN SERPL-MCNC: 90.7 NG/ML (ref 6.24–264)
IRON SERPL-MCNC: 58 UG/DL (ref 37–170)

## 2025-02-27 PROCEDURE — 83540 ASSAY OF IRON: CPT | Performed by: INTERNAL MEDICINE

## 2025-02-27 PROCEDURE — 82728 ASSAY OF FERRITIN: CPT | Performed by: INTERNAL MEDICINE

## 2025-05-05 ENCOUNTER — LAB REQUISITION (OUTPATIENT)
Dept: LAB | Facility: HOSPITAL | Age: OVER 89
End: 2025-05-05
Payer: MEDICARE

## 2025-05-05 DIAGNOSIS — N39.0 URINARY TRACT INFECTION, SITE NOT SPECIFIED: ICD-10-CM

## 2025-05-05 LAB
BILIRUB UR QL STRIP.AUTO: NEGATIVE
CLARITY UR: CLEAR
COLOR UR AUTO: YELLOW
GLUCOSE UR QL STRIP: NEGATIVE
HGB UR QL STRIP: NEGATIVE
KETONES UR QL STRIP: NEGATIVE
LEUKOCYTE ESTERASE UR QL STRIP: NEGATIVE
NITRITE UR QL STRIP: NEGATIVE
PH UR STRIP: 6 [PH]
PROT UR QL STRIP: NEGATIVE
SP GR UR STRIP.AUTO: 1.02 (ref 1–1.03)
UROBILINOGEN UR STRIP-ACNC: 0.2

## 2025-05-05 PROCEDURE — 87086 URINE CULTURE/COLONY COUNT: CPT | Performed by: INTERNAL MEDICINE

## 2025-05-05 PROCEDURE — 81003 URINALYSIS AUTO W/O SCOPE: CPT | Performed by: INTERNAL MEDICINE

## 2025-05-08 LAB — BACTERIA UR CULT: ABNORMAL

## (undated) DEVICE — DEVICE FLUSH SQZ CONT 1ML/HR

## (undated) DEVICE — FLUID DELIVERY SET WITH FILTER

## (undated) DEVICE — R NDL TRNSSPTL BRK-1 18GA 98CM

## (undated) DEVICE — ADAPTER DBL MALE LL CLR POLY

## (undated) DEVICE — ELECTRODE REM PLYHSV RETURN 9

## (undated) DEVICE — CATH IMPULSE 5FR PIGTAIL 125CM

## (undated) DEVICE — Device

## (undated) DEVICE — DILATOR AQ HYDRPHLC COAT 14F

## (undated) DEVICE — DILATOR VESSEL 10FR X 20CM

## (undated) DEVICE — INTRODUCER SWARTZ SL0 8.5FR

## (undated) DEVICE — KIT CO-PILOT

## (undated) DEVICE — SUT ETHBND XTRA 1 OS-8 30IN

## (undated) DEVICE — SHEATH DEL TORQVUE 12FR 8OCM

## (undated) DEVICE — PAD DEFIB RADIOLUCENT ADULT

## (undated) DEVICE — WIRE PGTL FLX SPRL  .025X230CM

## (undated) DEVICE — SYR 50CC LL

## (undated) DEVICE — STOPCOCK 3-WAY MED RA-RIGHT

## (undated) DEVICE — DILATOR VESSELL 8.0-38